# Patient Record
Sex: MALE | Race: OTHER | HISPANIC OR LATINO | Employment: UNEMPLOYED | URBAN - METROPOLITAN AREA
[De-identification: names, ages, dates, MRNs, and addresses within clinical notes are randomized per-mention and may not be internally consistent; named-entity substitution may affect disease eponyms.]

---

## 2017-01-12 ENCOUNTER — HISTORICAL (OUTPATIENT)
Dept: ADMINISTRATIVE | Facility: HOSPITAL | Age: 47
End: 2017-01-12

## 2017-01-13 ENCOUNTER — HISTORICAL (OUTPATIENT)
Dept: SURGERY | Facility: HOSPITAL | Age: 47
End: 2017-01-13

## 2017-05-02 ENCOUNTER — HISTORICAL (OUTPATIENT)
Dept: ENDOSCOPY | Facility: HOSPITAL | Age: 47
End: 2017-05-02

## 2017-05-15 ENCOUNTER — HISTORICAL (OUTPATIENT)
Dept: WOUND CARE | Facility: HOSPITAL | Age: 47
End: 2017-05-15

## 2018-04-09 ENCOUNTER — HOSPITAL ENCOUNTER (OUTPATIENT)
Dept: EMERGENCY MEDICINE | Facility: HOSPITAL | Age: 48
End: 2018-04-10
Attending: INTERNAL MEDICINE | Admitting: INTERNAL MEDICINE

## 2018-04-09 LAB
ABS NEUT (OLG): 3.18 X10(3)/MCL (ref 2.1–9.2)
ALBUMIN SERPL-MCNC: 4.2 GM/DL (ref 3.4–5)
ALBUMIN/GLOB SERPL: 1 RATIO (ref 1–2)
ALP SERPL-CCNC: 128 UNIT/L (ref 45–117)
ALT SERPL-CCNC: 40 UNIT/L (ref 12–78)
APPEARANCE, UA: CLEAR
AST SERPL-CCNC: <3 UNIT/L (ref 15–37)
BACTERIA #/AREA URNS AUTO: ABNORMAL /[HPF]
BASOPHILS # BLD AUTO: 0.02 X10(3)/MCL
BASOPHILS NFR BLD AUTO: 0 %
BILIRUB SERPL-MCNC: 0.6 MG/DL (ref 0.2–1)
BILIRUB UR QL STRIP: NEGATIVE
BILIRUBIN DIRECT+TOT PNL SERPL-MCNC: 0.1 MG/DL
BILIRUBIN DIRECT+TOT PNL SERPL-MCNC: 0.5 MG/DL
BUN SERPL-MCNC: 16 MG/DL (ref 7–18)
CALCIUM SERPL-MCNC: 8.9 MG/DL (ref 8.5–10.1)
CHLORIDE SERPL-SCNC: 102 MMOL/L (ref 98–107)
CHOLEST SERPL-MCNC: 218 MG/DL
CHOLEST/HDLC SERPL: 7.5 {RATIO} (ref 0–5)
CO2 SERPL-SCNC: 27 MMOL/L (ref 21–32)
COLOR UR: ABNORMAL
CREAT SERPL-MCNC: 0.8 MG/DL (ref 0.6–1.3)
EOSINOPHIL # BLD AUTO: 0.16 10*3/UL
EOSINOPHIL NFR BLD AUTO: 3 %
ERYTHROCYTE [DISTWIDTH] IN BLOOD BY AUTOMATED COUNT: 12.8 % (ref 11.5–14.5)
GLOBULIN SER-MCNC: 4.6 GM/ML (ref 2.3–3.5)
GLUCOSE (UA): >1000 MG/DL
GLUCOSE SERPL-MCNC: 274 MG/DL (ref 74–106)
HCT VFR BLD AUTO: 48.6 % (ref 40–51)
HDLC SERPL-MCNC: 29 MG/DL
HGB BLD-MCNC: 17.1 GM/DL (ref 13.5–17.5)
HGB UR QL STRIP: NEGATIVE
HYALINE CASTS #/AREA URNS LPF: ABNORMAL /[LPF]
IMM GRANULOCYTES # BLD AUTO: 0.01 10*3/UL
IMM GRANULOCYTES NFR BLD AUTO: 0 %
KETONES UR QL STRIP: ABNORMAL
LDLC SERPL CALC-MCNC: ABNORMAL MG/DL (ref 0–130)
LEUKOCYTE ESTERASE UR QL STRIP: NEGATIVE
LIPASE SERPL-CCNC: 143 UNIT/L (ref 73–393)
LYMPHOCYTES # BLD AUTO: 1.81 X10(3)/MCL
LYMPHOCYTES NFR BLD AUTO: 33 % (ref 13–40)
MCH RBC QN AUTO: 30 PG (ref 26–34)
MCHC RBC AUTO-ENTMCNC: 35.2 GM/DL (ref 31–37)
MCV RBC AUTO: 85.3 FL (ref 80–100)
MONOCYTES # BLD AUTO: 0.38 X10(3)/MCL
MONOCYTES NFR BLD AUTO: 7 % (ref 4–12)
NEUTROPHILS # BLD AUTO: 3.18 X10(3)/MCL
NEUTROPHILS NFR BLD AUTO: 57 X10(3)/MCL
NITRITE UR QL STRIP: NEGATIVE
PH UR STRIP: 5.5 [PH] (ref 4.5–8)
PLATELET # BLD AUTO: 217 X10(3)/MCL (ref 130–400)
PMV BLD AUTO: 11.5 FL (ref 7.4–10.4)
POC TROPONIN: 0 NG/ML (ref 0–0.08)
POC TROPONIN: 0.04 NG/ML (ref 0–0.08)
POTASSIUM SERPL-SCNC: 4 MMOL/L (ref 3.5–5.1)
PROT SERPL-MCNC: 8.8 GM/DL (ref 6.4–8.2)
PROT UR QL STRIP: NEGATIVE
RBC # BLD AUTO: 5.7 X10(6)/MCL (ref 4.5–5.9)
RBC #/AREA URNS AUTO: ABNORMAL /[HPF]
SODIUM SERPL-SCNC: 134 MMOL/L (ref 136–145)
SP GR UR STRIP: 1.04 (ref 1–1.03)
SQUAMOUS #/AREA URNS LPF: ABNORMAL /[LPF]
TRIGL SERPL-MCNC: 737 MG/DL
TROPONIN I SERPL-MCNC: <0.015 NG/ML (ref 0–0.05)
TROPONIN I SERPL-MCNC: <0.015 NG/ML (ref 0–0.05)
UROBILINOGEN UR STRIP-ACNC: NORMAL
VLDLC SERPL CALC-MCNC: ABNORMAL MG/DL
WBC # SPEC AUTO: 5.6 X10(3)/MCL (ref 4.5–11)
WBC #/AREA URNS AUTO: ABNORMAL /HPF

## 2018-04-10 LAB
ABS NEUT (OLG): 10.61 X10(3)/MCL (ref 2.1–9.2)
ABS NEUT (OLG): 7.45 X10(3)/MCL (ref 2.1–9.2)
ALBUMIN SERPL-MCNC: 3.8 GM/DL (ref 3.4–5)
ALBUMIN/GLOB SERPL: 1 RATIO (ref 1–2)
ALP SERPL-CCNC: 94 UNIT/L (ref 45–117)
ALT SERPL-CCNC: 35 UNIT/L (ref 12–78)
AST SERPL-CCNC: 12 UNIT/L (ref 15–37)
BASOPHILS # BLD AUTO: 0.02 X10(3)/MCL
BASOPHILS # BLD AUTO: 0.02 X10(3)/MCL
BASOPHILS NFR BLD AUTO: 0 %
BASOPHILS NFR BLD AUTO: 0 %
BILIRUB SERPL-MCNC: 0.7 MG/DL (ref 0.2–1)
BILIRUBIN DIRECT+TOT PNL SERPL-MCNC: 0.2 MG/DL
BILIRUBIN DIRECT+TOT PNL SERPL-MCNC: 0.5 MG/DL
BUN SERPL-MCNC: 20 MG/DL (ref 7–18)
CALCIUM SERPL-MCNC: 8.8 MG/DL (ref 8.5–10.1)
CHLORIDE SERPL-SCNC: 104 MMOL/L (ref 98–107)
CO2 SERPL-SCNC: 24 MMOL/L (ref 21–32)
CREAT SERPL-MCNC: 0.9 MG/DL (ref 0.6–1.3)
EOSINOPHIL # BLD AUTO: 0.02 10*3/UL
EOSINOPHIL # BLD AUTO: 0.05 X10(3)/MCL
EOSINOPHIL NFR BLD AUTO: 0 %
EOSINOPHIL NFR BLD AUTO: 0 %
ERYTHROCYTE [DISTWIDTH] IN BLOOD BY AUTOMATED COUNT: 12.9 % (ref 11.5–14.5)
ERYTHROCYTE [DISTWIDTH] IN BLOOD BY AUTOMATED COUNT: 12.9 % (ref 11.5–14.5)
EST. AVERAGE GLUCOSE BLD GHB EST-MCNC: 217 MG/DL
GLOBULIN SER-MCNC: 3.9 GM/ML (ref 2.3–3.5)
GLUCOSE SERPL-MCNC: 268 MG/DL (ref 74–106)
HBA1C MFR BLD: 9.2 % (ref 4.2–6.3)
HCT VFR BLD AUTO: 44.9 % (ref 40–51)
HCT VFR BLD AUTO: 45.5 % (ref 40–51)
HGB BLD-MCNC: 15.9 GM/DL (ref 13.5–17.5)
HGB BLD-MCNC: 15.9 GM/DL (ref 13.5–17.5)
IMM GRANULOCYTES # BLD AUTO: 0.02 10*3/UL
IMM GRANULOCYTES # BLD AUTO: 0.05 10*3/UL
IMM GRANULOCYTES NFR BLD AUTO: 0 %
IMM GRANULOCYTES NFR BLD AUTO: 0 %
LYMPHOCYTES # BLD AUTO: 0.61 X10(3)/MCL
LYMPHOCYTES # BLD AUTO: 0.99 X10(3)/MCL
LYMPHOCYTES NFR BLD AUTO: 7 % (ref 13–40)
LYMPHOCYTES NFR BLD AUTO: 8 % (ref 13–40)
MCH RBC QN AUTO: 29.7 PG (ref 26–34)
MCH RBC QN AUTO: 29.9 PG (ref 26–34)
MCHC RBC AUTO-ENTMCNC: 34.9 GM/DL (ref 31–37)
MCHC RBC AUTO-ENTMCNC: 35.4 GM/DL (ref 31–37)
MCV RBC AUTO: 84.4 FL (ref 80–100)
MCV RBC AUTO: 84.9 FL (ref 80–100)
MONOCYTES # BLD AUTO: 0.42 X10(3)/MCL
MONOCYTES # BLD AUTO: 0.8 X10(3)/MCL
MONOCYTES NFR BLD AUTO: 5 % (ref 4–12)
MONOCYTES NFR BLD AUTO: 6 % (ref 4–12)
NEUTROPHILS # BLD AUTO: 10.61 X10(3)/MCL
NEUTROPHILS # BLD AUTO: 7.45 X10(3)/MCL
NEUTROPHILS NFR BLD AUTO: 85 X10(3)/MCL
NEUTROPHILS NFR BLD AUTO: 87 X10(3)/MCL
PLATELET # BLD AUTO: 184 X10(3)/MCL (ref 130–400)
PLATELET # BLD AUTO: 190 X10(3)/MCL (ref 130–400)
PMV BLD AUTO: 11.2 FL (ref 7.4–10.4)
PMV BLD AUTO: 11.2 FL (ref 7.4–10.4)
POTASSIUM SERPL-SCNC: 3.7 MMOL/L (ref 3.5–5.1)
PROT SERPL-MCNC: 7.7 GM/DL (ref 6.4–8.2)
RBC # BLD AUTO: 5.32 X10(6)/MCL (ref 4.5–5.9)
RBC # BLD AUTO: 5.36 X10(6)/MCL (ref 4.5–5.9)
SODIUM SERPL-SCNC: 135 MMOL/L (ref 136–145)
TROPONIN I SERPL-MCNC: <0.015 NG/ML (ref 0–0.05)
WBC # SPEC AUTO: 12.5 X10(3)/MCL (ref 4.5–11)
WBC # SPEC AUTO: 8.5 X10(3)/MCL (ref 4.5–11)

## 2020-08-20 ENCOUNTER — HISTORICAL (OUTPATIENT)
Dept: RADIOLOGY | Facility: HOSPITAL | Age: 50
End: 2020-08-20

## 2020-09-29 ENCOUNTER — HISTORICAL (OUTPATIENT)
Dept: ADMINISTRATIVE | Facility: HOSPITAL | Age: 50
End: 2020-09-29

## 2020-09-29 LAB
ABS NEUT (OLG): 3.57 X10(3)/MCL (ref 2.1–9.2)
ALBUMIN SERPL-MCNC: 4.2 GM/DL (ref 3.4–5)
ALBUMIN/GLOB SERPL: 1.1 RATIO (ref 1.1–2)
ALP SERPL-CCNC: 74 UNIT/L (ref 45–117)
ALT SERPL-CCNC: 40 UNIT/L (ref 12–78)
AST SERPL-CCNC: 10 UNIT/L (ref 15–37)
BASOPHILS # BLD AUTO: 0 X10(3)/MCL (ref 0–0.2)
BASOPHILS NFR BLD AUTO: 0 %
BILIRUB SERPL-MCNC: 0.6 MG/DL (ref 0.2–1)
BILIRUBIN DIRECT+TOT PNL SERPL-MCNC: 0.1 MG/DL (ref 0–0.2)
BILIRUBIN DIRECT+TOT PNL SERPL-MCNC: 0.5 MG/DL
BUN SERPL-MCNC: 10 MG/DL (ref 7–18)
CALCIUM SERPL-MCNC: 9.5 MG/DL (ref 8.5–10.1)
CHLORIDE SERPL-SCNC: 104 MMOL/L (ref 98–107)
CHOLEST SERPL-MCNC: 237 MG/DL
CHOLEST/HDLC SERPL: 6.4 {RATIO} (ref 0–5)
CO2 SERPL-SCNC: 29 MMOL/L (ref 21–32)
CREAT SERPL-MCNC: 0.8 MG/DL (ref 0.6–1.3)
CREAT UR-MCNC: 120 MG/DL
EOSINOPHIL # BLD AUTO: 0.2 X10(3)/MCL (ref 0–0.9)
EOSINOPHIL NFR BLD AUTO: 3 %
ERYTHROCYTE [DISTWIDTH] IN BLOOD BY AUTOMATED COUNT: 12.5 % (ref 11.5–14.5)
EST. AVERAGE GLUCOSE BLD GHB EST-MCNC: 192 MG/DL
FERRITIN SERPL-MCNC: 175.5 NG/ML (ref 26–388)
GLOBULIN SER-MCNC: 3.9 GM/ML (ref 2.3–3.5)
GLUCOSE SERPL-MCNC: 153 MG/DL (ref 74–106)
HAV IGM SERPL QL IA: NONREACTIVE
HBA1C MFR BLD: 8.3 % (ref 4.2–6.3)
HBV CORE IGM SERPL QL IA: NONREACTIVE
HBV SURFACE AG SERPL QL IA: NONREACTIVE
HCT VFR BLD AUTO: 44.7 % (ref 40–51)
HCV AB SERPL QL IA: NONREACTIVE
HDLC SERPL-MCNC: 37 MG/DL (ref 40–59)
HGB BLD-MCNC: 15.8 GM/DL (ref 13.5–17.5)
HIV 1+2 AB+HIV1 P24 AG SERPL QL IA: NONREACTIVE
IMM GRANULOCYTES # BLD AUTO: 0.01 10*3/UL
IMM GRANULOCYTES NFR BLD AUTO: 0 %
LDLC SERPL CALC-MCNC: ABNORMAL MG/DL
LYMPHOCYTES # BLD AUTO: 2.3 X10(3)/MCL (ref 0.6–4.6)
LYMPHOCYTES NFR BLD AUTO: 35 %
MCH RBC QN AUTO: 29.8 PG (ref 26–34)
MCHC RBC AUTO-ENTMCNC: 35.3 GM/DL (ref 31–37)
MCV RBC AUTO: 84.2 FL (ref 80–100)
MONOCYTES # BLD AUTO: 0.5 X10(3)/MCL (ref 0.1–1.3)
MONOCYTES NFR BLD AUTO: 7 %
NEUTROPHILS # BLD AUTO: 3.57 X10(3)/MCL (ref 2.1–9.2)
NEUTROPHILS NFR BLD AUTO: 54 %
PLATELET # BLD AUTO: 229 X10(3)/MCL (ref 130–400)
PMV BLD AUTO: 10.3 FL (ref 7.4–10.4)
POTASSIUM SERPL-SCNC: 4.1 MMOL/L (ref 3.5–5.1)
PROT SERPL-MCNC: 8.1 GM/DL (ref 6.4–8.2)
PROT UR STRIP-MCNC: 27.8 MG/DL
PROT/CREAT UR-RTO: 231.7 MG/GM
PSA SERPL-MCNC: 0.8 NG/ML
RBC # BLD AUTO: 5.31 X10(6)/MCL (ref 4.5–5.9)
SODIUM SERPL-SCNC: 139 MMOL/L (ref 136–145)
TRIGL SERPL-MCNC: 690 MG/DL
TSH SERPL-ACNC: 1.9 MIU/L (ref 0.36–3.74)
VLDLC SERPL CALC-MCNC: ABNORMAL MG/DL
WBC # SPEC AUTO: 6.6 X10(3)/MCL (ref 4.5–11)

## 2020-12-30 ENCOUNTER — HISTORICAL (OUTPATIENT)
Dept: INTERNAL MEDICINE | Facility: CLINIC | Age: 50
End: 2020-12-30

## 2020-12-30 LAB
ALBUMIN SERPL-MCNC: 4.5 GM/DL (ref 3.5–5)
ALBUMIN/GLOB SERPL: 1.4 RATIO (ref 1.1–2)
ALP SERPL-CCNC: 90 UNIT/L (ref 40–150)
ALT SERPL-CCNC: 30 UNIT/L (ref 0–55)
AST SERPL-CCNC: 25 UNIT/L (ref 5–34)
BILIRUB SERPL-MCNC: 0.6 MG/DL
BILIRUBIN DIRECT+TOT PNL SERPL-MCNC: 0.3 MG/DL (ref 0–0.5)
BILIRUBIN DIRECT+TOT PNL SERPL-MCNC: 0.3 MG/DL (ref 0–0.8)
BUN SERPL-MCNC: 15 MG/DL (ref 8.4–25.7)
CALCIUM SERPL-MCNC: 9.4 MG/DL (ref 8.4–10.2)
CHLORIDE SERPL-SCNC: 102 MMOL/L (ref 98–107)
CHOLEST SERPL-MCNC: 123 MG/DL
CHOLEST/HDLC SERPL: 4 {RATIO} (ref 0–5)
CO2 SERPL-SCNC: 27 MMOL/L (ref 22–29)
CREAT SERPL-MCNC: 0.9 MG/DL (ref 0.73–1.18)
CREAT UR-MCNC: 114.8 MG/DL (ref 58–161)
EST. AVERAGE GLUCOSE BLD GHB EST-MCNC: 269 MG/DL
GLOBULIN SER-MCNC: 3.3 GM/DL (ref 2.4–3.5)
GLUCOSE SERPL-MCNC: 292 MG/DL (ref 74–100)
HBA1C MFR BLD: 11 %
HDLC SERPL-MCNC: 32 MG/DL (ref 35–60)
LDLC SERPL CALC-MCNC: 42 MG/DL (ref 50–140)
MICROALBUMIN UR-MCNC: 119.4 UG/ML
MICROALBUMIN/CREAT RATIO PNL UR: 104 MG/GM CR (ref 0–30)
POTASSIUM SERPL-SCNC: 4.2 MMOL/L (ref 3.5–5.1)
PROT SERPL-MCNC: 7.8 GM/DL (ref 6.4–8.3)
SODIUM SERPL-SCNC: 138 MMOL/L (ref 136–145)
TRIGL SERPL-MCNC: 247 MG/DL (ref 34–140)
VLDLC SERPL CALC-MCNC: 49 MG/DL

## 2021-01-28 ENCOUNTER — HISTORICAL (OUTPATIENT)
Dept: INTERNAL MEDICINE | Facility: CLINIC | Age: 51
End: 2021-01-28

## 2021-01-28 LAB
ALBUMIN SERPL-MCNC: 4.5 GM/DL (ref 3.5–5)
ALBUMIN/GLOB SERPL: 1.4 RATIO (ref 1.1–2)
ALP SERPL-CCNC: 75 UNIT/L (ref 40–150)
ALT SERPL-CCNC: 29 UNIT/L (ref 0–55)
AST SERPL-CCNC: 22 UNIT/L (ref 5–34)
BILIRUB SERPL-MCNC: 0.7 MG/DL
BILIRUBIN DIRECT+TOT PNL SERPL-MCNC: 0.3 MG/DL (ref 0–0.5)
BILIRUBIN DIRECT+TOT PNL SERPL-MCNC: 0.4 MG/DL (ref 0–0.8)
BUN SERPL-MCNC: 14 MG/DL (ref 8.4–25.7)
CALCIUM SERPL-MCNC: 9.4 MG/DL (ref 8.4–10.2)
CHLORIDE SERPL-SCNC: 101 MMOL/L (ref 98–107)
CO2 SERPL-SCNC: 28 MMOL/L (ref 22–29)
CREAT SERPL-MCNC: 0.98 MG/DL (ref 0.73–1.18)
GLOBULIN SER-MCNC: 3.2 GM/DL (ref 2.4–3.5)
GLUCOSE SERPL-MCNC: 245 MG/DL (ref 74–100)
POTASSIUM SERPL-SCNC: 4.6 MMOL/L (ref 3.5–5.1)
PROT SERPL-MCNC: 7.7 GM/DL (ref 6.4–8.3)
SODIUM SERPL-SCNC: 137 MMOL/L (ref 136–145)

## 2021-03-10 ENCOUNTER — HISTORICAL (OUTPATIENT)
Dept: INTERNAL MEDICINE | Facility: CLINIC | Age: 51
End: 2021-03-10

## 2021-03-10 LAB
ALBUMIN SERPL-MCNC: 4.4 GM/DL (ref 3.5–5)
ALBUMIN/GLOB SERPL: 1.4 RATIO (ref 1.1–2)
ALP SERPL-CCNC: 74 UNIT/L (ref 40–150)
ALT SERPL-CCNC: 34 UNIT/L (ref 0–55)
AST SERPL-CCNC: 24 UNIT/L (ref 5–34)
BILIRUB SERPL-MCNC: 0.8 MG/DL
BILIRUBIN DIRECT+TOT PNL SERPL-MCNC: 0.3 MG/DL (ref 0–0.5)
BILIRUBIN DIRECT+TOT PNL SERPL-MCNC: 0.5 MG/DL (ref 0–0.8)
BUN SERPL-MCNC: 15.8 MG/DL (ref 8.4–25.7)
CALCIUM SERPL-MCNC: 9.3 MG/DL (ref 8.4–10.2)
CHLORIDE SERPL-SCNC: 102 MMOL/L (ref 98–107)
CO2 SERPL-SCNC: 28 MMOL/L (ref 22–29)
CREAT SERPL-MCNC: 0.75 MG/DL (ref 0.73–1.18)
CREAT UR-MCNC: 133.5 MG/DL (ref 58–161)
EST. AVERAGE GLUCOSE BLD GHB EST-MCNC: 271.9 MG/DL
GLOBULIN SER-MCNC: 3.2 GM/DL (ref 2.4–3.5)
GLUCOSE SERPL-MCNC: 202 MG/DL (ref 74–100)
HBA1C MFR BLD: 11.1 %
MICROALBUMIN UR-MCNC: 24.8 MG/L
MICROALBUMIN/CREAT RATIO PNL UR: 18.6 MG/GM CR (ref 0–30)
POTASSIUM SERPL-SCNC: 4.2 MMOL/L (ref 3.5–5.1)
PROT SERPL-MCNC: 7.6 GM/DL (ref 6.4–8.3)
SODIUM SERPL-SCNC: 139 MMOL/L (ref 136–145)

## 2021-07-30 ENCOUNTER — HISTORICAL (OUTPATIENT)
Dept: INTERNAL MEDICINE | Facility: CLINIC | Age: 51
End: 2021-07-30

## 2021-07-30 LAB
ABS NEUT (OLG): 4.81 X10(3)/MCL (ref 2.1–9.2)
ALBUMIN SERPL-MCNC: 4.3 GM/DL (ref 3.5–5)
ALBUMIN/GLOB SERPL: 1.3 RATIO (ref 1.1–2)
ALP SERPL-CCNC: 86 UNIT/L (ref 40–150)
ALT SERPL-CCNC: 35 UNIT/L (ref 0–55)
APPEARANCE, UA: CLEAR
AST SERPL-CCNC: 28 UNIT/L (ref 5–34)
BACTERIA #/AREA URNS AUTO: ABNORMAL /HPF
BASOPHILS # BLD AUTO: 0 X10(3)/MCL (ref 0–0.2)
BASOPHILS NFR BLD AUTO: 0 %
BILIRUB SERPL-MCNC: 0.7 MG/DL
BILIRUB UR QL STRIP: NEGATIVE
BILIRUBIN DIRECT+TOT PNL SERPL-MCNC: 0.3 MG/DL (ref 0–0.5)
BILIRUBIN DIRECT+TOT PNL SERPL-MCNC: 0.4 MG/DL (ref 0–0.8)
BUN SERPL-MCNC: 15.1 MG/DL (ref 8.4–25.7)
CALCIUM SERPL-MCNC: 9.7 MG/DL (ref 8.4–10.2)
CHLORIDE SERPL-SCNC: 102 MMOL/L (ref 98–107)
CO2 SERPL-SCNC: 30 MMOL/L (ref 22–29)
COLOR UR: YELLOW
CREAT SERPL-MCNC: 0.84 MG/DL (ref 0.73–1.18)
EOSINOPHIL # BLD AUTO: 0.1 X10(3)/MCL (ref 0–0.9)
EOSINOPHIL NFR BLD AUTO: 2 %
ERYTHROCYTE [DISTWIDTH] IN BLOOD BY AUTOMATED COUNT: 12.7 % (ref 11.5–14.5)
EST. AVERAGE GLUCOSE BLD GHB EST-MCNC: 277.6 MG/DL
GLOBULIN SER-MCNC: 3.3 GM/DL (ref 2.4–3.5)
GLUCOSE (UA): 30 MG/DL
GLUCOSE SERPL-MCNC: 195 MG/DL (ref 74–100)
HBA1C MFR BLD: 11.3 %
HCT VFR BLD AUTO: 44.9 % (ref 40–51)
HGB BLD-MCNC: 15 GM/DL (ref 13.5–17.5)
HGB UR QL STRIP: NEGATIVE
HYALINE CASTS #/AREA URNS LPF: ABNORMAL /LPF
IMM GRANULOCYTES # BLD AUTO: 0.02 10*3/UL
IMM GRANULOCYTES NFR BLD AUTO: 0 %
KETONES UR QL STRIP: NEGATIVE
LEUKOCYTE ESTERASE UR QL STRIP: NEGATIVE
LYMPHOCYTES # BLD AUTO: 2.1 X10(3)/MCL (ref 0.6–4.6)
LYMPHOCYTES NFR BLD AUTO: 27 %
MCH RBC QN AUTO: 28.6 PG (ref 26–34)
MCHC RBC AUTO-ENTMCNC: 33.4 GM/DL (ref 31–37)
MCV RBC AUTO: 85.7 FL (ref 80–100)
MONOCYTES # BLD AUTO: 0.6 X10(3)/MCL (ref 0.1–1.3)
MONOCYTES NFR BLD AUTO: 8 %
NEUTROPHILS # BLD AUTO: 4.81 X10(3)/MCL (ref 2.1–9.2)
NEUTROPHILS NFR BLD AUTO: 62 %
NITRITE UR QL STRIP: NEGATIVE
NRBC BLD AUTO-RTO: 0 % (ref 0–0.2)
PH UR STRIP: 6.5 [PH] (ref 4.5–8)
PLATELET # BLD AUTO: 226 X10(3)/MCL (ref 130–400)
PMV BLD AUTO: 10.8 FL (ref 7.4–10.4)
POTASSIUM SERPL-SCNC: 4.3 MMOL/L (ref 3.5–5.1)
PROT SERPL-MCNC: 7.6 GM/DL (ref 6.4–8.3)
PROT UR QL STRIP: 10 MG/DL
RBC # BLD AUTO: 5.24 X10(6)/MCL (ref 4.5–5.9)
RBC #/AREA URNS AUTO: ABNORMAL /HPF
SODIUM SERPL-SCNC: 139 MMOL/L (ref 136–145)
SP GR UR STRIP: 1.02 (ref 1–1.03)
SQUAMOUS #/AREA URNS LPF: ABNORMAL /LPF
UROBILINOGEN UR STRIP-ACNC: 4 MG/DL
WBC # SPEC AUTO: 7.8 X10(3)/MCL (ref 4.5–11)
WBC #/AREA URNS AUTO: ABNORMAL /HPF

## 2021-09-10 ENCOUNTER — HISTORICAL (OUTPATIENT)
Dept: ADMINISTRATIVE | Facility: HOSPITAL | Age: 51
End: 2021-09-10

## 2021-09-10 LAB
ABS NEUT (OLG): 4.44 X10(3)/MCL (ref 2.1–9.2)
BASOPHILS # BLD AUTO: 0 X10(3)/MCL (ref 0–0.2)
BASOPHILS NFR BLD AUTO: 0 %
BUN SERPL-MCNC: 15.2 MG/DL (ref 8.4–25.7)
CALCIUM SERPL-MCNC: 9.4 MG/DL (ref 8.4–10.2)
CHLORIDE SERPL-SCNC: 109 MMOL/L (ref 98–107)
CO2 SERPL-SCNC: 23 MMOL/L (ref 22–29)
CREAT SERPL-MCNC: 0.88 MG/DL (ref 0.73–1.18)
CREAT/UREA NIT SERPL: 17
EOSINOPHIL # BLD AUTO: 0.1 X10(3)/MCL (ref 0–0.9)
EOSINOPHIL NFR BLD AUTO: 2 %
ERYTHROCYTE [DISTWIDTH] IN BLOOD BY AUTOMATED COUNT: 12.8 % (ref 11.5–14.5)
EST CREAT CLEARANCE SER (OHS): 85.19 ML/MIN
EST. AVERAGE GLUCOSE BLD GHB EST-MCNC: 217.3 MG/DL
GLUCOSE SERPL-MCNC: 166 MG/DL (ref 74–100)
HBA1C MFR BLD: 9.2 %
HCT VFR BLD AUTO: 42.3 % (ref 40–51)
HGB BLD-MCNC: 14.6 GM/DL (ref 13.5–17.5)
IMM GRANULOCYTES # BLD AUTO: 0.01 10*3/UL
IMM GRANULOCYTES NFR BLD AUTO: 0 %
LYMPHOCYTES # BLD AUTO: 1.9 X10(3)/MCL (ref 0.6–4.6)
LYMPHOCYTES NFR BLD AUTO: 27 %
MCH RBC QN AUTO: 29 PG (ref 26–34)
MCHC RBC AUTO-ENTMCNC: 34.5 GM/DL (ref 31–37)
MCV RBC AUTO: 84.1 FL (ref 80–100)
MONOCYTES # BLD AUTO: 0.5 X10(3)/MCL (ref 0.1–1.3)
MONOCYTES NFR BLD AUTO: 8 %
NEUTROPHILS # BLD AUTO: 4.44 X10(3)/MCL (ref 2.1–9.2)
NEUTROPHILS NFR BLD AUTO: 63 %
NRBC BLD AUTO-RTO: 0 % (ref 0–0.2)
PLATELET # BLD AUTO: 218 X10(3)/MCL (ref 130–400)
PMV BLD AUTO: 9.9 FL (ref 7.4–10.4)
POTASSIUM SERPL-SCNC: 3.6 MMOL/L (ref 3.5–5.1)
RBC # BLD AUTO: 5.03 X10(6)/MCL (ref 4.5–5.9)
SARS-COV-2 AG RESP QL IA.RAPID: NEGATIVE
SODIUM SERPL-SCNC: 141 MMOL/L (ref 136–145)
WBC # SPEC AUTO: 7 X10(3)/MCL (ref 4.5–11)

## 2021-09-13 ENCOUNTER — HISTORICAL (OUTPATIENT)
Dept: SURGERY | Facility: HOSPITAL | Age: 51
End: 2021-09-13

## 2021-09-14 ENCOUNTER — HISTORICAL (OUTPATIENT)
Dept: INTERNAL MEDICINE | Facility: CLINIC | Age: 51
End: 2021-09-14

## 2021-09-14 LAB
ALBUMIN SERPL-MCNC: 4.6 GM/DL (ref 3.5–5)
ALBUMIN/GLOB SERPL: 1.2 RATIO (ref 1.1–2)
ALP SERPL-CCNC: 71 UNIT/L (ref 40–150)
ALT SERPL-CCNC: 32 UNIT/L (ref 0–55)
AST SERPL-CCNC: 16 UNIT/L (ref 5–34)
BILIRUB SERPL-MCNC: 0.7 MG/DL
BILIRUBIN DIRECT+TOT PNL SERPL-MCNC: 0.3 MG/DL (ref 0–0.5)
BILIRUBIN DIRECT+TOT PNL SERPL-MCNC: 0.4 MG/DL (ref 0–0.8)
BUN SERPL-MCNC: 14.4 MG/DL (ref 8.4–25.7)
CALCIUM SERPL-MCNC: 9.7 MG/DL (ref 8.4–10.2)
CHLORIDE SERPL-SCNC: 105 MMOL/L (ref 98–107)
CO2 SERPL-SCNC: 26 MMOL/L (ref 22–29)
CREAT SERPL-MCNC: 0.83 MG/DL (ref 0.73–1.18)
GLOBULIN SER-MCNC: 3.8 GM/DL (ref 2.4–3.5)
GLUCOSE SERPL-MCNC: 151 MG/DL (ref 74–100)
POTASSIUM SERPL-SCNC: 3.6 MMOL/L (ref 3.5–5.1)
PROT SERPL-MCNC: 8.4 GM/DL (ref 6.4–8.3)
SODIUM SERPL-SCNC: 139 MMOL/L (ref 136–145)

## 2021-12-10 ENCOUNTER — HISTORICAL (OUTPATIENT)
Dept: INTERNAL MEDICINE | Facility: CLINIC | Age: 51
End: 2021-12-10

## 2021-12-10 LAB
ALBUMIN SERPL-MCNC: 4.3 GM/DL (ref 3.5–5)
ALBUMIN/GLOB SERPL: 1.2 RATIO (ref 1.1–2)
ALP SERPL-CCNC: 83 UNIT/L (ref 40–150)
ALT SERPL-CCNC: 42 UNIT/L (ref 0–55)
AST SERPL-CCNC: 23 UNIT/L (ref 5–34)
BILIRUB SERPL-MCNC: 0.6 MG/DL
BILIRUBIN DIRECT+TOT PNL SERPL-MCNC: 0.3 MG/DL (ref 0–0.5)
BILIRUBIN DIRECT+TOT PNL SERPL-MCNC: 0.3 MG/DL (ref 0–0.8)
BUN SERPL-MCNC: 13.2 MG/DL (ref 8.4–25.7)
CALCIUM SERPL-MCNC: 9.8 MG/DL (ref 8.7–10.5)
CHLORIDE SERPL-SCNC: 105 MMOL/L (ref 98–107)
CHOLEST SERPL-MCNC: 96 MG/DL
CHOLEST/HDLC SERPL: 3 {RATIO} (ref 0–5)
CO2 SERPL-SCNC: 29 MMOL/L (ref 22–29)
CREAT SERPL-MCNC: 0.82 MG/DL (ref 0.73–1.18)
CREAT UR-MCNC: 109.1 MG/DL (ref 58–161)
EST. AVERAGE GLUCOSE BLD GHB EST-MCNC: 180 MG/DL
GLOBULIN SER-MCNC: 3.6 GM/DL (ref 2.4–3.5)
GLUCOSE SERPL-MCNC: 168 MG/DL (ref 74–100)
HBA1C MFR BLD: 7.9 %
HDLC SERPL-MCNC: 35 MG/DL (ref 35–60)
LDLC SERPL CALC-MCNC: 33 MG/DL (ref 50–140)
MICROALBUMIN UR-MCNC: 6.6 MG/L
MICROALBUMIN/CREAT RATIO PNL UR: 6 MG/GM CR (ref 0–30)
POTASSIUM SERPL-SCNC: 4.6 MMOL/L (ref 3.5–5.1)
PROT SERPL-MCNC: 7.9 GM/DL (ref 6.4–8.3)
SODIUM SERPL-SCNC: 142 MMOL/L (ref 136–145)
TRIGL SERPL-MCNC: 139 MG/DL (ref 34–140)
TSH SERPL-ACNC: 2.45 UIU/ML (ref 0.35–4.94)
VLDLC SERPL CALC-MCNC: 28 MG/DL

## 2021-12-15 LAB
LEFT EYE DM RETINOPATHY: NEGATIVE
RIGHT EYE DM RETINOPATHY: NEGATIVE

## 2022-01-11 LAB — HEMOCCULT STL QL IA: NEGATIVE

## 2022-02-28 ENCOUNTER — HISTORICAL (OUTPATIENT)
Dept: INTERNAL MEDICINE | Facility: CLINIC | Age: 52
End: 2022-02-28

## 2022-02-28 LAB
EST. AVERAGE GLUCOSE BLD GHB EST-MCNC: 205.9 MG/DL
HBA1C MFR BLD: 8.8 %

## 2022-03-15 ENCOUNTER — HISTORICAL (OUTPATIENT)
Dept: ADMINISTRATIVE | Facility: HOSPITAL | Age: 52
End: 2022-03-15

## 2022-04-10 ENCOUNTER — HISTORICAL (OUTPATIENT)
Dept: ADMINISTRATIVE | Facility: HOSPITAL | Age: 52
End: 2022-04-10

## 2022-04-27 VITALS
SYSTOLIC BLOOD PRESSURE: 125 MMHG | DIASTOLIC BLOOD PRESSURE: 77 MMHG | HEIGHT: 65 IN | OXYGEN SATURATION: 98 % | BODY MASS INDEX: 33.61 KG/M2 | WEIGHT: 201.75 LBS

## 2022-04-30 NOTE — ED PROVIDER NOTES
Patient:   Sinan Jimenez             MRN: [id number]            FIN: [id number]               Age:   48 years     Sex:  Male     :  1970   Associated Diagnoses:   Chest pain; Abdominal pain, acute, epigastric   Author:   Jose Angel ALCAZAR, Delon BROWN      Basic Information   Time seen: Date & time 2018 08:45:00.   History source: Patient.   Additional information: Chief Complaint from Nursing Triage Note : Chief Complaint   2018 8:34 CDT        Chief Complaint           PT W CO CP W NV, HA AND RUQ ABD PAIN THAT RAD AROUND TO BACK X 3 DAYS.  EKG OBTAINED,  HX OF DM   .      History of Present Illness   The patient presents with chest pain, indigestion and heartburn.  The onset was 3  days ago.  The course/duration of symptoms is constant and worsening.  Location: Right epigastric RUQ. Radiating pain: central back. The character of symptoms is sharp.  The degree at onset was moderate.  The degree at maximum was moderate.  The degree at present is moderate.  The exacerbating factor is none.  The relieving factor is none.  Risk factors consist of hypertension and diabetes mellitus.  Associated symptoms: nausea, vomiting and history of rib fractures 2017, CP since then.        Review of Systems   Cardiovascular symptoms:  Chest pain.   Gastrointestinal symptoms:  Abdominal pain, right upper quadrant, epigastric, nausea, vomiting.              Additional review of systems information: All other systems reviewed and otherwise negative.      Health Status   Allergies:    Allergic Reactions (Selected)  No Known Allergies.      Past Medical/ Family/ Social History   Medical history:    Active  Hypertension ([id number])  DM2 (diabetes mellitus, type 2) ([id number])  HTN (hypertension) ([id number]), Reviewed as documented in chart.   Surgical history:    Biopsy Gastrointestinal on 2017 at 47  Years.  Comments:  5/2/2017 14:03 - Mariel Flaherty RN  auto-populated from documented surgical case  Esophagogastroduodenoscopy on 5/2/2017 at 47 Years.  Comments:  5/2/2017 14:03 - Mariel Flaherty RN  auto-populated from documented surgical case  Cholecystectomy Laparoscopic (None) on 1/13/2017 at 46 Years.  Comments:  1/13/2017 10:28 - Kayode NOONAN , Maria Elena CORRIGAN  auto-populated from documented surgical case  denies.  Cholecystectomy ([id number])., Reviewed as documented in chart.   Family history:    Diabetes mellitus type 2  Mother  Brother  Hypertension.  Brother  Alcoholism.  Father  , Reviewed as documented in chart.   Social history: Alcohol use: Denies, Tobacco use: Denies, Drug use: Denies.      Physical Examination               Vital Signs   Vital Signs   4/9/2018 8:34 CDT        Temperature Oral          36.7 DegC                             Temperature Oral (calculated)             98.06 DegF                             Peripheral Pulse Rate     95 bpm                             Respiratory Rate          16 br/min                             SpO2                      98 %                             Oxygen Therapy            Room air                             Systolic Blood Pressure   157 mmHg  HI                             Diastolic Blood Pressure  101 mmHg  HI  .   Measurements   4/9/2018 8:34 CDT        Weight Dosing             92 kg                             Weight Measured           92 kg                             Weight Measured and Calculated in Lbs     202.82 lb                             Height/Length Dosing      170 cm                             Height/Length Measured    170 cm                             Body Mass Index Measured  31.83 kg/m2  .   Basic Oxygen Information   4/9/2018 8:34 CDT        SpO2                      98 %                             Oxygen Therapy            Room air  .   General:  Alert.   Skin:  Pink, intact.    Head:  Atraumatic.   Neck:  Trachea midline.    Eye:  Pupils are equal, round and reactive to light, extraocular movements are intact, normal conjunctiva.    Ears, nose, mouth and throat:  Oral mucosa moist.   Cardiovascular:  Regular rate and rhythm.   Respiratory:  Lungs are clear to auscultation.   Gastrointestinal:  Soft, Non distended, Normal bowel sounds, No organomegaly, + Epigastric and RUQ TTP.    Back:  Normal range of motion.   Musculoskeletal:  Normal ROM.   Neurological:  Alert and oriented to person, place, time, and situation.   Psychiatric:  Cooperative, appropriate mood & affect.              Additional physical exam information: Traslator line used for workup.      Medical Decision Making   Electrocardiogram:  Time 4/9/2018 08:30:00, rate 84, normal sinus rhythm, EP Interp, inf infaract age indeter.       Impression and Plan   Diagnosis   Chest pain (PNED [id number])   Abdominal pain, acute, epigastric (CLR94-UO R10.13)      Calls-Consults   -  4/9/2018 13:09:00 , Mane ALCAZAR, Lidia DING, Medicine.    Plan   Disposition

## 2022-04-30 NOTE — DISCHARGE SUMMARY
* Final Report *  Admission Information This is a 48-year-old  male with past medical history of traumatic forklift accident with splenic contusions and left rib fractures July 2017, type II diabetes, and hypertension, however, not prescribed any medications for either diagnosis, who presents to [facility] emergency department for right-sided chest pain. Patient describes via official  pain as sharp, constant, right-sided with radiation to side, nonreproducible presences clinch injury in the past. Pain is not associated with shortness of breath, cough, dyspnea and exertion, PND, orthopnea, bilateral lower extremity anemia, relief with changes in position, palpatations, or syncope, or cough. Patient states that he has had traumatic crush injury previously, resulting in multiple left-sided rib fractures for which he is on multiple ER visits. Chest x-ray consistent with old left-sided rib fractures and abdominal x-ray shows a nonspecific gas pattern. EKG normal sinus rhythm with Q waves in II and aVF unchanged from previous EKG. POC troponin negative and upon trended ×3 every 6 hours negative, and cholesterol 218. Initial blood pressure 157/101 however improved with analgesics. Ultrasound reveals hepatic steatosis however, patient denies drinking history. Hospital Course   Significant Findings troponin every 6 hours ×3 negative  Cholesterol 218 per lipid panel  A1c 9.2  EKG with no acute new findings   Procedures and Treatment Provided Patient medically managed with aspirin, Toradol, statin, Protonix  He is admitted for ACS rule out with no acute cardiac findings  Chest x-ray consistent with atelectasis likely due from previous lung injury  48 year-old  male with past medical history of traumatic injury to left side resulting in multiple rib fractures and chronic rib pain admitted for ACS rule out who presented with chest pain. Troponins negative with no acute findings however A1c 9.2 as well as  hyperlipidemia therefore patient discharged with metformin thousand twice a day, lisinopril 5 daily, rosuvastatin 40 mg take daily at night. He should follow-up with PCP 1-2 weeks postdischarge.  Physical Exam General: AAOx3, NAD, alert and cooperative  HEENT: PERRLA, EOMI  Neck: no LAD, no JVD, supple  CVS: S1/S2 nml, RRR, no murmurs, tenderness to palpation across chest  Resp: CTA b/l, no wheezing  GI: Not distended, not tender, BS+, no guarding  Musculoskeletal: normal ROM, no joint tenderness, normal muscular development  Extremities: no peripheral edema, peripheral pulses intact and equal  Neuro: CN II-XII grossly intact, strength and sensation symmetric and intact throughout, no focal neurological deficit  Discharge Plan She admitted for ACS rule out with no significant cardiac findings during hospitalizations  Troponins every 6 negative as well as no acute findings  Patient A1c found to be 9.2 therefore metformin thousand twice a day started  Recommend patient start lisinopril 5 mg daily for hypertension as well as and statin for hyperlipidemia  Patient to follow-up with PCP in 1-2 weeks from discharge  Patient is instructed to return to emergency department if symptoms worsen or return  Patient instructed to take over-the-counter analgesics for rib pain   used so the patient does understand and agree with plan  Orders:   lisinopril, 5 mg = 1 tab(s), Oral, Daily, # 30 tab(s), 3 Refill(s) metFORMIN, 1,000 mg = 1 tab(s), Oral, BID, # 60 tab(s), 3 Refill(s) rosuvastatin, 40 mg = 1 tab(s), Oral, Daily, # 30 tab(s), 3 Refill(s) Discharge, Home   Patient Discharge Condition He is stable and okay for discharge home   Result type: Discharge Summary   Result date: April 10, 2018 13:47 CDT   Result status: Auth (Verified)   Result title: Discharge Note   Performed by: Denice Crow DO on April 10, 2018 13:54 CDT   Verified by: Denice Crow DO on April 10, 2018 14:01 CDT   Encounter info:  [id number], [facility], Inpatient, 6/22/2017 - 6/30/2017   Doc has been moved by HIM specialist to the correct doc type.

## 2022-04-30 NOTE — OP NOTE
DATE OF SURGERY:    05/02/2017    SURGEON:  Sundar Guido MD    attending physician:  Sundar Guido MD    PROCEDURE:  Esophagogastroduodenoscopy with biopsy.    PREOPERATIVE DIAGNOSIS:  Abdominal pain.    POSTOPERATIVE DIAGNOSES:    1. Abdominal pain.   2. Gastritis.    FINDINGS:  Mild diffuse gastric mucosal erythema.    SPECIMEN:  Yes.    BLOOD LOSS:  Trace.    DESCRIPTION OF PROCEDURE:  After informed consent was obtained, patient was sedated with IV propofol after which time the Olympus video gastroscope was inserted into the oropharynx and advanced down the esophagus, finding no evidence of esophageal mucosal abnormalities.  The stomach was then inspected, finding mild diffuse gastric mucosal erythema with submucosal hemorrhage suggestive of H pylori.  The scope was advanced down to the pylorus which was normal and into the duodenum, and down to the 3rd portion of the duodenum finding no duodenal abnormalities.  The scope was then was brought back into the stomach, retroflexed, again noting the mild diffuse gastric erythema.  An antral biopsy was obtained for tissue urease test, after which time the scope was withdrawn and the patient then sent to the holding area.        ______________________________  MD NAT Couch/MODL  DD:  05/02/2017  Time:  02:06PM  DT:  05/02/2017  Time:  02:21PM  Job #:  [id number]

## 2022-04-30 NOTE — OP NOTE
DATE OF SURGERY:        SURGEON:  Adebayo Molina MD    attending physician:  Adebayo Molina MD    PREOPERATIVE DIAGNOSIS:  Basal cell carcinoma of the vertex scalp, less than 2 cm.    POSTOPERATIVE DIAGNOSIS:  Basal cell carcinoma of the vertex scalp, less than 2 cm.    PROCEDURE:  Primary excision of basal cell carcinoma of the scalp, 1.9 cm with primary closure.    INDICATIONS FOR PROCEDURE:  The patient is a 51-year-old with a long-standing history of slow growing lesion on his head.  He presents for excisional biopsy and primary closure.    ANESTHESIA:  General.    COMPLICATIONS:  None.    PROCEDURE IN DETAIL:  The patient was endotracheally intubated, prepped and draped in the usual sterile fashion.  A circular incision was made using 4 mm margins around the basal cell carcinoma using a 15 blade scalpel after 1% lidocaine epinephrine was instilled into the incision.  After this was cut off, it was sent to Pathology.  We closed the deep tissues with interrupted 0 Vicryl suture and then staples used to close the scalp.  No complications.  I was scrubbed and present for the entire procedure.        ______________________________  MD JUNG Ramirez/MODL  DD:  09/13/2021  Time:  10:34AM  DT:  09/13/2021  Time:  10:55AM  Job #:  [id number]

## 2022-05-03 NOTE — HISTORICAL [facility] CERNER
This is a historical note converted from Shav[employer]. Formatting and pictures may have been removed.  Please reference Shavonne for original formatting and attached multimedia. 51 year old male with history of DM, HTN and basal cell scalp lesion here for excision.  ?  Patient seen, examined, and marked?in pre-op by me.?No significant changes in history or physical exam from most recent clinic visit.  ?   Patient voices understanding of intended procedure: primary excision of scalp lesion with possible local rotational flap and indicated procedures  ?   Patient denies taking any blood thinners and has been NPO since mn.  ?   Questions entertained and answered. Patient is ready to proceed.  ?  Shirin Pan MD  [employer] Surgery, PGY-1  9/13/2021 06:17:46  ?   used [id number]

## 2022-05-03 NOTE — HISTORICAL [facility] CERNER
This is a historical note converted from [employer]ner. Formatting and pictures may have been removed.  Please reference [employer]ner for original formatting and attached multimedia. Chief Complaint  PT W CO CP W NV, HA AND RUQ ABD PAIN THAT RAD AROUND TO BACK X 3 DAYS. EKG OBTAINED, HX OF DM   History of Present Illness  This is a 48-year-old  male with past medical history of?traumatic forklift accident with splenic contusions and left rib fractures July 2017, type II diabetes, and hypertension, however, not prescribed any medications for either diagnosis, who presents to [facility] emergency department for right-sided chest pain. ?Patient describes via official  pain as sharp, constant, right-sided with radiation to side, nonreproducible presences clinch injury in the past. ?Pain is not associated with shortness of breath, cough, dyspnea and exertion, PND, orthopnea, bilateral lower extremity anemia, relief with changes in position, palpatations, or syncope,?or cough. Patient states that he has had traumatic crush injury previously, resulting in multiple left-sided rib fractures for?which he is on multiple ER visits.??Chest x-ray consistent with old left-sided rib fractures and abdominal x-ray shows a nonspecific gas pattern. ?EKG normal sinus rhythm with Q waves in II and aVF unchanged from previous EKG. ?POC troponin negative and 1st trended troponin negative, and cholesterol 218. ?Initial blood pressure 157/101 however improved with analgesics.?Ultrasound reveals hepatic steatosis however, patient denies drinking history.  ?  Review of Systems  Constitutional: no weightloss,?fever, chills, weakness or fatigue  Respiratory: No shortness of breath, cough or sputum. No Dyspnea on exertion.  Cardiovascular:?+ chest pain.  Gastrointestinal: no anorexia, nausea, vomiting or diarrhea. No abdominal pain or blood.  Musculoskeletal: No muscle, back pain, joint pain or stiffness.  Integumentary: No rash or  itching.  Neurologic: No headache, dizziness, syncope, paralysis, ataxia, numbness or tingling in the extremities. No change in bowel or bladder control.  All Other ROS_  ?  Physical Exam  Vitals & Measurements  T:?36.7? ?C (Oral)? HR:?92(Peripheral)? HR:?87(Monitored)? RR:?19? BP:?130/86? SpO2:?96%? WT:?92?kg?  General: AAOx3, NAD, alert and cooperative  HEENT: PERRLA, EOMI  Neck: no LAD, no JVD, supple  CVS: S1/S2 nml, RRR, no murmurs, no tenderness to palpation  Resp: CTA b/l, no wheezing  GI: Not distended, not tender, BS+, no guarding  Skin: not jaundiced, warm  Musculoskeletal: normal ROM, no joint tenderness, normal muscular development  Extremities:?no peripheral edema, peripheral pulses intact and equal  Neuro: CN II-XII grossly intact, strength and sensation symmetric and intact throughout, no focal neurological deficits  ?  Assessment/Plan  1. ?Acute chest pain  2.??History type II diabetes  3.??Hypertension  4.??Hx of rib?fractures  5. Hepatic steatosis per abd US  ?  ?   48?year-old man with hisotry of type II diabetes and hypertension, however not on any home?medications who?presented to ED with?chronic?chest pain explained by history of?chest trauma?+6 months ago with?patient blood pressure elevated upon admission, however improved with analgesics which are?when necessary continued inpatient.? Vitals remained stable.??Patient started on aspirin and rosuvastatin.? Patient with history of type II diabetes and hypertension and started on?sliding scale insulin,?A1c pending, CXR pending. With both HEART score 3 and HOWARD 13 patient is low risk.??He wasd?admitted to telemetry with?troponin trended q6h x3?with EKGs. Will?likely discharge patient in AM if he?remains stable and no change in clinical status and EKG and troponin have remained unchanged. ?Will discharge patient on?low-dose ACE inhibitor +/- beta blocker.  ?  ?  ?   Lovenox 40 mg subcu  GI prophylaxis Protonix   Problem List/Past Medical  History  Ongoing  DM2 (diabetes mellitus, type 2)  Gallstones  HTN (hypertension)  Hypertension  Obesity  Historical  No qualifying data  Procedure/Surgical History  Laparoscopy, surgical; cholecystectomy (01/13/2017),  Medications  Inpatient  acetaminophen, 1000 mg= 2 tab(s), Oral, q6hr, PRN  albuterol, 2.5 mg= 3 mL, NEB, q4hr, PRN  aspirin 81 mg oral tablet, CHEWABLE, 81 mg= 1 tab(s), Oral, Daily  Dextrose 50% and Water (50 mL vial/syringe), 25 gm= 50 mL, IV Push, As Directed  Dextrose 50% and Water (50 mL vial/syringe), 12.5 gm= 25 mL, IV Push, As Directed, PRN  Dextrose 50% and Water (50 mL vial/syringe), 12.5 gm= 25 mL, IV Push, Once, PRN  Dextrose 50% in Water intravenous solution, 12.5 gm= 25 mL, IV Push, As Directed, PRN  GI Cocktail - [facility]  glucagon recombinant 1 mg injection, 1 mg= 1 EA, IM, q10min, PRN  glucagon recombinant 1 mg injection, 1 mg= 1 EA, IM, q10min, PRN  glucose 40% oral gel, 15 gm= 0.5 tube(s), Oral, As Directed, PRN  insulin lispro 100 units/mL subcutaneous injection, 3-21 units, Subcutaneous, As Directed, PRN  labetalol, 20 mg= 4 mL, IV Push, q2hr, PRN  Lovenox, 40 mg= 0.4 mL, Subcutaneous, Daily  Protonix, 40 mg= 1 tab(s), Oral, Daily  Zofran, 4 mg= 2 mL, IV Push, q4hr, PRN  Home  traMADol 50 mg oral tablet, 50 mg= 1 tab(s), Oral, q6hr, PRN,? 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 Living situation: Home/Independent. Home equipment: b/p cuff., 09/07/2017  Substance Abuse  Never, 09/07/2017  Tobacco - Denies Tobacco Use, 09/07/2017  Never smoker, 09/07/2017  Family History  Alcoholism.: Father.  Diabetes mellitus type 2: Mother and Brother.  Hypertension.: Brother.  Lab Results  General Lab  BUN: 16 mg/dL (04/09/18 09:00:00)  Creatinine: 0.8 mg/dL (04/09/18 09:00:00)  Glucose Lvl:?274  mg/dL?High (04/09/18 09:00:00)  Hct: 48.6 % (04/09/18 09:00:00)  Hgb: 17.1 gm/dL (04/09/18 09:00:00)  WBC: 5.6 x10(3)/mcL (04/09/18 09:00:00)  Platelet: 217 x10(3)/mcL (04/09/18 09:00:00)  Potassium Lvl: 4 mmol/L (04/09/18 09:00:00)  Sodium Lvl:?134 mmol/L?Low (04/09/18 09:00:00)  ?  Diagnostic Results  Accession:?[id number]  Order:?XR Ribs Left 2 Views  Report Dt/Tm:?04/09/2018 12:21  Report:?  ?  EXAM: XR Ribs Left 2 Views, XR Ribs Right 2 Views  ?  INDICATION: hx fx june 2017;Chest Pain  ?  TECHNIQUE: 4 views of the right ribs and 4 views of the left ribs are  obtained.  ?  COMPARISON: None  ?  FINDINGS: There are remote fracture deformities of multiple left ribs  laterally. No acute fracture is identified. No aggressive lytic or  blastic osseous lesion is seen. Mild degenerative changes are noted  along the spine. The imaged lungs are clear. Surgical clips overlie  the gallbladder fossa.  ?  ?  IMPRESSION:?  No acute osseous abnormality identified. Multiple remote left rib  fractures.  ?  ?  Accession:?[id number]  Order:?XR Abdomen Flat and Erect  Report Dt/Tm:?04/09/2018 10:16  Report:?  2 views of the abdomen.  ?  HISTORY: Abdominal pain.  ?  FINDINGS: Examination reveals some residual feces throughout the colon  the gas pattern is nonspecific with no clear evidence of ileus or  obstruction no abnormal masses or calcifications identified no  evidence of free air.  ?  IMPRESSION: Nonspecific gas pattern  ?  ?  Accession:?[id number]  Order:?US Abdomen Limited  Report Dt/Tm:?04/09/2018 09:49  Report:?  ?  ULTRASOUND ABDOMEN LIMITED:  ?  CLINICAL: Abdominal pain  ?  TECHNIQUE: ?Multiple real-time transverse and longitudinal sections  were performed of the right abdomen by the sonographer. ?Select images  were submitted for review.?  ?  FINDINGS: Generalized hepatic parenchymal increased echogenicity is  considered with steatosis. There was no delineation of discrete  hepatic cystic or solid mass. ?Hepatic  maximum diameter of 17.2 cm.?  There was unremarkable hepatopedal flow within the portal vein.  Pancreas was obscured due to overlying bowel gas. ?There has been  previous surgical resection of the gallbladder. Common bile duct  caliber of 4.1 mm is within normal limits for the age.?  ??  Normally located right kidney length measures 11.0 x 6.0 x 5.5 cm.?  Right renal corticomedullary differentiation is unremarkable. ?No  evidence of hydronephrosis. Inferior vena cava is unremarkable and  visualized abdominal aorta is without aneurysmal dilatation.  ?  IMPRESSION:  ?  1. Hepatic steatosis.  2. Obscured pancreas.  3. Previous cholecystectomy.  ?  ?  ?      Agree with Dr. Montes documentation above. Pt with right lower chest, abdominal pain since accident at work last year. Troponin 0.00, repeat 0.04. EKG with Q waves that were present on previous EKG. Med on call consulted for R/O ACS. Trending troponin and EKG q6hrs. Pt denies any hx of heart issues, no symptomf CHF. Reviewed imaging in the ED. Possible d/c candida.

## 2022-05-03 NOTE — HISTORICAL [facility] CERNER
This is a historical note converted from Sh[place]onne. Formatting and pictures may have been removed.  Please reference Shavonne for original formatting and attached multimedia. Chief Complaint  1 month follow up; right neck pain;RUQ pain 15days ago burning sensation  History of Present Illness  August 20, 2020 Telemedicine Visit: Sinan is a 51 yo  male due for follow up. Last visit in [facility] July 9, 2018. PMhx includes type 2 DM, HTN, HLD, obesity, and CP s/t hx of rib fractures. Recent inpatient admission for pneumonia r/t COVID19 on June 16 and discharged on June 23. Was treated with Z mendez, Rocephin, Decadron, Remdesivir. Attended post pace follow up on July 7, 2020. Denies use of O2 at home after 1.5 weeks discharge. States he is feeling well. His only complaint is c/o right sided neck and shoulder pain. Onset when lying on right side only. Denies any dizziness, weakness, numbness to arm, blurred vision, tinnitus, CP, n/v. Denies any tx because only occurs when lying on right side. Does not have BP machine at present because he is in parking lot. Checking CBGs at home with readings around?120-180. Tolerating Metformin BID and was started on Basaglar 15 units while inpatient. Completed Xarelto as prescribed. Not on ASA. Not taking Crestor. Denies fever, chills, HA, sore throat, blurred vision, tinnitus, dizziness, cough,?CP, SOB, palpitations, wheezing, abdominal pain, poor appetite, loss of taste/ smell, constipation, n/v/d. No other complaints at this time.?  ?  September 29, 2020  Patient here for f/u.  services used for visit. Labs completed with pending all results.?c/o right sided neck. Onset of symptoms when lying on right side. XR?reviewed wither cervical spondylosis. Denies any numbness, tingling, weakness to right arm.?Pain occurs only when lying on right side. Patient is self pay and has no insurance. Denies any tx.?c/o RUQ pain x 15 days. Describes as burning type of pain. /78. A1c  improved to 8.3%. Fasting CBGs at home with readings around 140-190. Denies hypoglycemia episodes. Does admit to drinking a lot of soft drinks. Dm foot/ eye exam today. H/o tobacco abuse, quit age 27 yo. Denies any fever, chills, HA, sore throat, blurred vision, tinnitus, weakness, dizziness, cough, SOB, CP, n/v/d, bloody stools or blood in urine.  Review of Systems  Constitutional: negative except as stated in HPI  Eye: negative except as stated in HPI  ENMT: negative except as stated in HPI  Respiratory: negative except as stated in HPI  Cardiovascular: negative except as stated in HPI  Gastrointestinal: negative except as stated in HPI  Genitourinary: negative except as stated in HPI  Hema/Lymph: negative except as stated in HPI  Endocrine: negative except as stated in HPI  Immunologic: negative except as stated in HPI  Musculoskeletal: negative except as stated in HPI  Integumentary: negative except as stated in HPI  Neurologic: negative except as stated in HPI  ?   All Other ROS_ ?negative except as stated in HPI  Physical Exam  Vitals & Measurements  T:?36.7? ?C (Oral)? HR:?81(Peripheral)? RR:?16? BP:?134/78?  HT:?172.00?cm? WT:?90.090?kg? BMI:?30.45?  General: Alert and oriented. Appropriately dressed.?No acute distress.  Eye: Pupils are equal, round and reactive to light, Extraocular movements are intact.  HENT: Normocephalic. BTM intact without effusion, exudate or erythema. Wearing facial mask.  Neck: Supple, Non-tender. No lymphadenopathy. No thyromegaly, firmness or nodularity.  Respiratory: Lungs are clear to auscultation, Respirations are non-labored, Breath sounds are equal, Symmetrical chest wall expansion.  Cardiovascular: Normal rate, Regular rhythm, No murmur. +2 radial and pedal pulses, bilaterally; without BLE edema.  Gastrointestinal: Soft, mild tenderness to RUQ and midepigastric with palpation, Non-distended, Normal bowel sounds x 4. No palpable masses or hernia.  Musculoskeletal: Cervical  spinous process without tenderness to palpation or obvious deformity. ROM intact. Strength RUE, LUE 5/5.  Integumentary: Warm, Dry, Intact.  DM foot : See documentation.  Neurologic: No focal deficits, Cranial Nerves II-XII are grossly intact.  Psychiatric: Calm. Appropriate mood and affect. Judgment intact with clear thought processes.  Assessment/Plan  1.?RUQ pain?R10.11  c/o RUQ pain, describes as burning sensation x 15 days  follow GERD diet  begin omeprazole 20 mg once daily  pending CMP results from today  Ordered:  omeprazole, 20 mg = 1 cap(s), Oral, Daily, # 30 cap(s), 3 Refill(s), Pharmacy: [facility], 172, cm, Height/Length Dosing, 09/29/20 12:26:00 CDT, 90.09, kg, Weight Dosing, 09/29/20 12:26:00 CDT  ?  2.?Cervical spondylosis?M47.812  XR cervical spine August 20, 2020?  FINDINGS: There is loss of normal cervical lordosis which may be  positional or related to myospasm. Intervertebral disc space  degenerative changes and spondylosis is mostly apparent at C5-C6.  Cervical vertebrae stature and alignment is preserved. There is no  prevertebral soft tissue prominence. No fractures. IMPRESSION: Cervical spondylosis mostly at C5-C6.  patient is self pay and has no insurance  will provide with neck exercises in [place] for patient to engage in at home  educated on proper support while sleeping for supportive pillow and may take with OTC Tylenol for pain relief  if any worsening s/s, notify provider for sooner appt  ?  3.?DM2 (diabetes mellitus, type 2)?E11.9  A1c improved to 8.3% September 29, 2020; Previously was?11.3% Vy 15, 2020  CBG checks as above around 120-180  Educated on ADA diet:  1. Avoid/ decrease high carbohydrate foods (rice, pasta, bread, candy, sweets, carbonated beverages)  Educated on health benefits of?at least 5 days/ week?of 30 minutes moderate intensity exercise (brisk walking) and 2 or more days/ week of muscle strength activities  Avoid alcohol or tobacco use if  applicable  Eye exam: fundus photos today 9/29/2020  Foot exam: 9/29/2020  ACEI/ ARB- begin lisinopril 2.5 mg once daily?  Continue ASA 81 mg once daily; resume statin  Increase Basaglar to 18 units SC q HS. Continue Metformin 1000 mg BID.  Monitor for hypoglycemia episodes; if occurs notify provider.  Ordered:  aspirin, 81 mg = 1 tab(s), Oral, Daily, # 30 tab(s), 3 Refill(s), Pharmacy: [facility], 172, cm, Height/Length Dosing, 09/29/20 12:26:00 CDT, 90.09, kg, Weight Dosing, 09/29/20 12:26:00 CDT  lisinopril, 2.5 mg = 1 tab(s), Oral, Daily, # 30 tab(s), 3 Refill(s), Pharmacy: [facility], 172, cm, Height/Length Dosing, 09/29/20 12:26:00 CDT, 90.09, kg, Weight Dosing, 09/29/20 12:26:00 CDT  metFORMIN, 1,000 mg = 1 tab(s), Oral, BID, with meals, # 60 tab(s), 3 Refill(s), Pharmacy: [facility], 172, cm, Height/Length Dosing, 09/29/20 12:26:00 CDT, 90.09, kg, Weight Dosing, 09/29/20 12:26:00 CDT  rosuvastatin, 40 mg = 1 tab(s), Oral, Once a day (at bedtime), # 30 tab(s), 3 Refill(s), Pharmacy: [facility], 172, cm, Height/Length Dosing, 09/29/20 12:26:00 CDT, 90.09, kg, Weight Dosing, 09/29/20 12:26:00 CDT  Clinic Follow up, *Est. 12/29/20 3:00:00 CST, Order for future visit, DM2 (diabetes mellitus, type 2)  HTN (hypertension)  Elevated alkaline phosphatase level  Hyperlipidemia, [facility]  Comprehensive Metabolic Panel, Routine collect, *Est. 12/29/20 3:00:00 CST, Blood, Order for future visit, *Est. Stop date 12/29/20 3:00:00 CST, Lab Collect, DM2 (diabetes mellitus, type 2), 09/29/20 13:00:00 CDT  Comprehensive Metabolic Panel, Routine collect, 09/29/20 11:49:00 CDT, Blood, Stop date 09/29/20 11:49:00 CDT, Lab Collect, DM2 (diabetes mellitus, type 2)  HTN (hypertension)  Obesity  Well adult exam, 09/29/20 11:49:00 CDT  Ferritin, Routine collect, 09/29/20 11:49:00 CDT, Blood, Stop date 09/29/20 11:49:00 CDT, Lab Collect, DM2  (diabetes mellitus, type 2)  HTN (hypertension)  Obesity  Well adult exam, 09/29/20 11:49:00 CDT  Hemoglobin A1C [facility], Routine collect, *Est. 12/29/20 3:00:00 CST, Blood, Order for future visit, *Est. Stop date 12/29/20 3:00:00 CST, Lab Collect, DM2 (diabetes mellitus, type 2), 09/29/20 13:00:00 CDT  Hepatitis Panel [facility], Routine collect, 09/29/20 11:49:00 CDT, Blood, Stop date 09/29/20 11:49:00 CDT, Lab Collect, DM2 (diabetes mellitus, type 2)  HTN (hypertension)  Obesity  Well adult exam, 09/29/20 11:49:00 CDT  HIV 1 and 2, Routine collect, 09/29/20 11:49:00 CDT, Blood, Stop date 09/29/20 11:49:00 CDT, Lab Collect, DM2 (diabetes mellitus, type 2)  HTN (hypertension)  Obesity  Well adult exam, 09/29/20 11:49:00 CDT  Lipid Panel, Routine collect, 09/29/20 11:49:00 CDT, Blood, Stop date 09/29/20 11:49:00 CDT, Lab Collect, DM2 (diabetes mellitus, type 2)  HTN (hypertension)  Obesity  Well adult exam, 09/29/20 11:49:00 CDT  Microalbum/Creatinine Ratio Urine (Microalb/Creat), Routine collect, Urine, Order for future visit, *Est. 12/29/20 3:00:00 CST, *Est. Stop date 12/29/20 3:00:00 CST, Nurse collect, DM2 (diabetes mellitus, type 2)  Protein/Creatinine Ratio Urine, Routine collect, Urine, 09/29/20 11:49:00 CDT, Stop date 09/29/20 11:49:00 CDT, Nurse collect, DM2 (diabetes mellitus, type 2)  HTN (hypertension)  Obesity  Well adult exam  Thyroid Stimulating Hormone, Routine collect, 09/29/20 11:49:00 CDT, Blood, Stop date 09/29/20 11:49:00 CDT, Lab Collect, DM2 (diabetes mellitus, type 2)  HTN (hypertension)  Obesity  Well adult exam, 09/29/20 11:49:00 CDT  ?  4.?HTN (hypertension)?I10  /78  Educated on low sodium diet  Educated to avoid alcohol or tobacco use if applicable  Educated on health benefits of?at least 5 days/ week?of 30 minutes moderate intensity exercise (brisk walking) and 2 or more days/ week of muscle strength activities  Continue current medication regimen; begin lisinopril for  renoprotective benefits  Ordered:  aspirin, 81 mg = 1 tab(s), Oral, Daily, # 30 tab(s), 3 Refill(s), Pharmacy: [facility], 172, cm, Height/Length Dosing, 09/29/20 12:26:00 CDT, 90.09, kg, Weight Dosing, 09/29/20 12:26:00 CDT  lisinopril, 2.5 mg = 1 tab(s), Oral, Daily, # 30 tab(s), 3 Refill(s), Pharmacy: [facility], 172, cm, Height/Length Dosing, 09/29/20 12:26:00 CDT, 90.09, kg, Weight Dosing, 09/29/20 12:26:00 CDT  Clinic Follow up, *Est. 12/29/20 3:00:00 CST, Order for future visit, DM2 (diabetes mellitus, type 2)  HTN (hypertension)  Elevated alkaline phosphatase level  Hyperlipidemia, [facility]  Comprehensive Metabolic Panel, Routine collect, 09/29/20 11:49:00 CDT, Blood, Stop date 09/29/20 11:49:00 CDT, Lab Collect, DM2 (diabetes mellitus, type 2)  HTN (hypertension)  Obesity  Well adult exam, 09/29/20 11:49:00 CDT  Ferritin, Routine collect, 09/29/20 11:49:00 CDT, Blood, Stop date 09/29/20 11:49:00 CDT, Lab Collect, DM2 (diabetes mellitus, type 2)  HTN (hypertension)  Obesity  Well adult exam, 09/29/20 11:49:00 CDT  Hepatitis Panel [facility], Routine collect, 09/29/20 11:49:00 CDT, Blood, Stop date 09/29/20 11:49:00 CDT, Lab Collect, DM2 (diabetes mellitus, type 2)  HTN (hypertension)  Obesity  Well adult exam, 09/29/20 11:49:00 CDT  HIV 1 and 2, Routine collect, 09/29/20 11:49:00 CDT, Blood, Stop date 09/29/20 11:49:00 CDT, Lab Collect, DM2 (diabetes mellitus, type 2)  HTN (hypertension)  Obesity  Well adult exam, 09/29/20 11:49:00 CDT  Lipid Panel, Routine collect, 09/29/20 11:49:00 CDT, Blood, Stop date 09/29/20 11:49:00 CDT, Lab Collect, DM2 (diabetes mellitus, type 2)  HTN (hypertension)  Obesity  Well adult exam, 09/29/20 11:49:00 CDT  Protein/Creatinine Ratio Urine, Routine collect, Urine, 09/29/20 11:49:00 CDT, Stop date 09/29/20 11:49:00 CDT, Nurse collect, DM2 (diabetes mellitus, type 2)  HTN (hypertension)  Obesity  Well adult  exam  Thyroid Stimulating Hormone, Routine collect, 09/29/20 11:49:00 CDT, Blood, Stop date 09/29/20 11:49:00 CDT, Lab Collect, DM2 (diabetes mellitus, type 2)  HTN (hypertension)  Obesity  Well adult exam, 09/29/20 11:49:00 CDT  ?  5.?Elevated alkaline phosphatase level?R74.8  Alk phos elevated while inpatient  prior CMP with LFT wnl  Abdominal US Vy 15, 2020 with hepatomegaly and hepatic steatosis  per discharge note, recommended outpatient testing for further testing, ordered-- labs completed today- pending results  Ordered:  Actin (Smooth Muscle) Antibody-[facility] [id number], Routine collect, 09/29/20 11:49:00 CDT, Blood, Stop date 09/29/20 11:49:00 CDT, Lab Collect, Elevated alkaline phosphatase level, 09/29/20 11:49:00 CDT  Clinic Follow up, *Est. 12/29/20 3:00:00 CST, Order for future visit, DM2 (diabetes mellitus, type 2)  HTN (hypertension)  Elevated alkaline phosphatase level  Hyperlipidemia, [facility]  ?  6.?Obesity?E66.9  BMI 30.45  Educated on health benefits of?at least 5 days/ week?of 30 minutes moderate intensity exercise (brisk walking) and 2 or more days/ week of muscle strength activities  Educated on low fat/carb diet  Ordered:  Comprehensive Metabolic Panel, Routine collect, 09/29/20 11:49:00 CDT, Blood, Stop date 09/29/20 11:49:00 CDT, Lab Collect, DM2 (diabetes mellitus, type 2)  HTN (hypertension)  Obesity  Well adult exam, 09/29/20 11:49:00 CDT  Ferritin, Routine collect, 09/29/20 11:49:00 CDT, Blood, Stop date 09/29/20 11:49:00 CDT, Lab Collect, DM2 (diabetes mellitus, type 2)  HTN (hypertension)  Obesity  Well adult exam, 09/29/20 11:49:00 CDT  Hepatitis Panel [facility], Routine collect, 09/29/20 11:49:00 CDT, Blood, Stop date 09/29/20 11:49:00 CDT, Lab Collect, DM2 (diabetes mellitus, type 2)  HTN (hypertension)  Obesity  Well adult exam, 09/29/20 11:49:00 CDT  HIV 1 and 2, Routine collect, 09/29/20 11:49:00 CDT, Blood, Stop date 09/29/20 11:49:00 CDT, Lab Collect, DM2  (diabetes mellitus, type 2)  HTN (hypertension)  Obesity  Well adult exam, 09/29/20 11:49:00 CDT  Lipid Panel, Routine collect, 09/29/20 11:49:00 CDT, Blood, Stop date 09/29/20 11:49:00 CDT, Lab Collect, DM2 (diabetes mellitus, type 2)  HTN (hypertension)  Obesity  Well adult exam, 09/29/20 11:49:00 CDT  Protein/Creatinine Ratio Urine, Routine collect, Urine, 09/29/20 11:49:00 CDT, Stop date 09/29/20 11:49:00 CDT, Nurse collect, DM2 (diabetes mellitus, type 2)  HTN (hypertension)  Obesity  Well adult exam  Thyroid Stimulating Hormone, Routine collect, 09/29/20 11:49:00 CDT, Blood, Stop date 09/29/20 11:49:00 CDT, Lab Collect, DM2 (diabetes mellitus, type 2)  HTN (hypertension)  Obesity  Well adult exam, 09/29/20 11:49:00 CDT  ?  7.?Well adult exam?Z00.00  Health Maintenance:  CRC- FIT ordered  PSA- ordered- pending  AAA- H/o tobacco abuse, quit age 27 yo.  ?   Family History negative.  ?   Vaccines:  Influenza- 12/13/18  Pneumonia-  Tdap-  Ordered:  Comprehensive Metabolic Panel, Routine collect, 09/29/20 11:49:00 CDT, Blood, Stop date 09/29/20 11:49:00 CDT, Lab Collect, DM2 (diabetes mellitus, type 2)  HTN (hypertension)  Obesity  Well adult exam, 09/29/20 11:49:00 CDT  Ferritin, Routine collect, 09/29/20 11:49:00 CDT, Blood, Stop date 09/29/20 11:49:00 CDT, Lab Collect, DM2 (diabetes mellitus, type 2)  HTN (hypertension)  Obesity  Well adult exam, 09/29/20 11:49:00 CDT  Hepatitis Panel [facility], Routine collect, 09/29/20 11:49:00 CDT, Blood, Stop date 09/29/20 11:49:00 CDT, Lab Collect, DM2 (diabetes mellitus, type 2)  HTN (hypertension)  Obesity  Well adult exam, 09/29/20 11:49:00 CDT  HIV 1 and 2, Routine collect, 09/29/20 11:49:00 CDT, Blood, Stop date 09/29/20 11:49:00 CDT, Lab Collect, DM2 (diabetes mellitus, type 2)  HTN (hypertension)  Obesity  Well adult exam, 09/29/20 11:49:00 CDT  Lipid Panel, Routine collect, 09/29/20 11:49:00 CDT, Blood, Stop date 09/29/20 11:49:00 CDT, Lab  Collect, DM2 (diabetes mellitus, type 2)  HTN (hypertension)  Obesity  Well adult exam, 09/29/20 11:49:00 CDT  Protein/Creatinine Ratio Urine, Routine collect, Urine, 09/29/20 11:49:00 CDT, Stop date 09/29/20 11:49:00 CDT, Nurse collect, DM2 (diabetes mellitus, type 2)  HTN (hypertension)  Obesity  Well adult exam  Thyroid Stimulating Hormone, Routine collect, 09/29/20 11:49:00 CDT, Blood, Stop date 09/29/20 11:49:00 CDT, Lab Collect, DM2 (diabetes mellitus, type 2)  HTN (hypertension)  Obesity  Well adult exam, 09/29/20 11:49:00 CDT  ?  Orders:  amLODIPine, 10 mg = 1 tab(s), Oral, Daily, # 30 tab(s), 3 Refill(s), Pharmacy: [facility], 172, cm, Height/Length Dosing, 09/29/20 12:26:00 CDT, 90.09, kg, Weight Dosing, 09/29/20 12:26:00 CDT  insulin glargine, 18 units, Subcutaneous, Once a day (at bedtime), # 10 mL, 3 Refill(s), Pharmacy: [facility], 172, cm, Height/Length Dosing, 09/29/20 12:26:00 CDT, 90.09, kg, Weight Dosing, 09/29/20 12:26:00 CDT  Misc Prescription, Lancets, See Instructions, Use twice daily for CBG checks Dx: E11.9, # 200 EA, 3 Refill(s), Pharmacy: [facility], 172, cm, Height/Length Dosing, 09/29/20 12:26:00 CDT, 90.09, kg, Weight Dosing, 09/29/20 12:26:00 CDT  Misc Prescription, Glucometer Test Strips, See Instructions, Use twice daily for CBG checks Dx: E11.9, # 200 EA, 3 Refill(s), Pharmacy: [facility], 172, cm, Height/Length Dosing, 09/29/20 12:26:00 CDT, 90.09, kg, Weight Dosing, 09/29/20 12:26:00 CDT  Misc Prescription, Pen Needles, See Instructions, Use daily for Insulin administration Dx: e11.9, # 100 EA, 3 Refill(s), Pharmacy: [facility], 172, cm, Height/Length Dosing, 09/29/20 12:26:00 CDT, 90.09, kg, Weight Dosing, 09/29/20 12:26:00 CDT  Occult Blood Fecal Immunoassay, Routine collect, Stool, Order for future visit, *Est. 09/29/20 3:00:00 CDT, *Est. Stop date 09/29/20 3:00:00  CDT, Nurse collect, Screening for colon cancer  Prostate Specific Antigen, Routine collect, 09/29/20 11:49:00 CDT, Blood, Stop date 09/29/20 11:49:00 CDT, Lab Collect, 09/29/20 11:49:00 CDT  RTC in 3 mo with labs.  If at any time condition worsens or experience new symptoms/ concerns, please call clinic for sooner appointment or go to ED/UCC.  Referrals  [facility], Specialty: Ophthalmology, Reason: Fundus photos, Refer To: Referral, Fundus Exam, [facility], [address] [place] [address]., Start: 09/29/20 12:35:00 CDT  Clinic Follow up, *Est. 12/29/20 3:00:00 CST, Order for future visit, DM2 (diabetes mellitus, type 2)  HTN (hypertension)  Elevated alkaline phosphatase level  Hyperlipidemia, [facility]   Problem List/Past Medical History  Ongoing  DM2 (diabetes mellitus, type 2)  History of tobacco abuse  HTN (hypertension)  Obesity  Historical  2019-nCoV  Chest pain  Gallstones  Procedure/Surgical History  Biopsy Gastrointestinal (05/02/2017)  Esophagogastroduodenoscopy (05/02/2017)  Esophagogastroduodenoscopy, flexible, transoral; with biopsy, single or multiple (05/02/2017)  Excision of Stomach, Via Natural or Artificial Opening Endoscopic, Diagnostic (05/02/2017)  Cholecystectomy Laparoscopic (None) (01/13/2017)  Laparoscopy, surgical; cholecystectomy (01/13/2017)  Resection of Gallbladder, Percutaneous Endoscopic Approach (01/13/2017)  Cholecystectomy   Medications  amlodipine 10 mg oral tablet, 10 mg= 1 tab(s), Oral, Daily, 3 refills  aspirin 81 mg oral Delayed Release (EC) tablet, 81 mg= 1 tab(s), Oral, Daily, 3 refills  Basaglar KwikPen 100 units/mL subcutaneous solution, 18 units, Subcutaneous, Once a day (at bedtime), 3 refills  blood glucose monitor, See Instructions  Crestor 40 mg oral tablet, 40 mg= 1 tab(s), Oral, Once a day (at bedtime), 3 refills  Glucometer Test Strips, See Instructions, 3 refills  home oxygen, See  Instructions,? ?Not taking: Last Dose Date/Time Unknown  Lancets, See Instructions, 3 refills  lisinopril 2.5 mg oral tablet, 2.5 mg= 1 tab(s), Oral, Daily, 3 refills  metFORMIN 1000 mg oral tablet, 1000 mg= 1 tab(s), Oral, BID, 3 refills  omeprazole 20 mg oral DR capsule, 20 mg= 1 cap(s), Oral, Daily, 3 refills  Pen Needles, See Instructions, 3 refills  Allergies  No Known Allergies  Social History  Abuse/Neglect  No, 09/29/2020  Alcohol  Never, Alcohol use interferes with work or home: No. Others hurt by drinking: No. 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 Living situation: Home/Independent. Home equipment: b/p cuff., 04/11/2017  Nutrition/Health  Regular, 06/04/2018  Sexual  Substance Use  Never, 10/27/2016  Tobacco - Denies Tobacco Use, 02/12/2016  Smoker, current status unknown, N/A, 28 Years (Age stopped)., 09/29/2020  Family History  Alcoholism.: Father.  Diabetes mellitus type 2: Mother and Brother.  Hypertension.: Brother.  Immunizations  Vaccine Date Status   influenza virus vaccine, inactivated 12/13/2018 Recorded   Health Maintenance  Health Maintenance  ???Pending?(in the next year)  ??? ??OverDue  ??? ? ? ?Diabetes Maintenance-Microalbumin due??and every?  ??? ??Due?  ??? ? ? ?Colorectal Screening due??09/29/20??and every?  ??? ? ? ?Diabetes Maintenance-Eye Exam due??09/29/20??and every?  ??? ? ? ?Diabetes Maintenance-Foot Exam due??09/29/20??and every?  ??? ? ? ?Influenza Vaccine due??09/29/20??and every?  ??? ? ? ?Tetanus Vaccine due??09/29/20??and every 10??year(s)  ??? ? ? ?Zoster Vaccine due??09/29/20??and every?  ??? ??Due In Future?  ??? ? ? ?Obesity Screening not due until??01/01/21??and every 1??year(s)  ??? ? ? ?Alcohol Misuse Screening not due until??01/02/21??and every 1??year(s)  ??? ? ? ?Hypertension Management-BMP not  due until??06/23/21??and every 1??year(s)  ???Satisfied?(in the past 1 year)  ??? ??Satisfied?  ??? ? ? ?ADL Screening on??09/29/20.??Satisfied by Jasmin Silveira LPN  ??? ? ? ?Alcohol Misuse Screening on??09/29/20.??Satisfied by Jasmin Silveira LPN  ??? ? ? ?Aspirin Therapy for CVD Prevention on??09/29/20.??Satisfied by Virginia ANGUIANO-Mariangel CONLEY.  ??? ? ? ?Blood Pressure Screening on??09/29/20.??Satisfied by Jasmin Silveira LPN  ??? ? ? ?Body Mass Index Check on??09/29/20.??Satisfied by Jasmin Silveira LPN  ??? ? ? ?Depression Screening on??09/29/20.??Satisfied by Jasmin Silveira LPN  ??? ? ? ?Diabetes Maintenance-Foot Exam on??09/29/20.??Satisfied by Mariangel West.  ??? ? ? ?Diabetes Maintenance-HgbA1c on??09/29/20.??Satisfied by Lisa Lopes  ??? ? ? ?Diabetes Screening on??09/29/20.??Satisfied by Lisa Lopes  ??? ? ? ?Hypertension Management-Blood Pressure on??09/29/20.??Satisfied by Jasmin Silveira LPN  ??? ? ? ?Obesity Screening on??09/29/20.??Satisfied by Jasmin Silveira LPN  ?  Lab Results  Test Name Test Result Date/Time    mg/dL (High) 09/29/2020 11:58 CDT   Hgb A1c 8.3 % (High) 09/29/2020 11:58 CDT   WBC 6.6 x10(3)/mcL 09/29/2020 11:58 CDT   RBC 5.31 x10(6)/mcL 09/29/2020 11:58 CDT   Hgb 15.8 gm/dL 09/29/2020 11:58 CDT   Hct 44.7 % 09/29/2020 11:58 CDT   Platelet 229 x10(3)/mcL 09/29/2020 11:58 CDT   MCV 84.2 fL 09/29/2020 11:58 CDT   MCH 29.8 pg 09/29/2020 11:58 CDT   MCHC 35.3 gm/dL 09/29/2020 11:58 CDT   RDW 12.5 % 09/29/2020 11:58 CDT   MPV 10.3 fL 09/29/2020 11:58 CDT   Abs Neut 3.57 x10(3)/mcL 09/29/2020 11:58 CDT   Neutro Auto 54 % 09/29/2020 11:58 CDT   Lymph Auto 35 % 09/29/2020 11:58 CDT   Mono Auto 7 % 09/29/2020 11:58 CDT   Eos Auto 3 % 09/29/2020 11:58 CDT   Abs Eos 0.2 x10(3)/mcL 09/29/2020 11:58 CDT   Basophil Auto 0 % 09/29/2020 11:58 CDT   Abs Neutro 3.57 x10(3)/mcL 09/29/2020 11:58 CDT   Abs Lymph 2.3 x10(3)/mcL 09/29/2020 11:58 CDT   Abs Mono 0.5 x10(3)/mcL  09/29/2020 11:58 CDT   Abs Baso 0.0 x10(3)/mcL 09/29/2020 11:58 CDT   IG% 0 % 09/29/2020 11:58 CDT   IG# 0.0100 09/29/2020 11:58 CDT

## 2022-06-10 ENCOUNTER — LAB VISIT (OUTPATIENT)
Dept: LAB | Facility: HOSPITAL | Age: 52
End: 2022-06-10
Attending: NURSE PRACTITIONER

## 2022-06-10 DIAGNOSIS — E11.9 DM (DIABETES MELLITUS): Primary | ICD-10-CM

## 2022-06-10 LAB
ALBUMIN SERPL-MCNC: 4.4 GM/DL (ref 3.5–5)
ALBUMIN/GLOB SERPL: 1.4 RATIO (ref 1.1–2)
ALP SERPL-CCNC: 80 UNIT/L (ref 40–150)
ALT SERPL-CCNC: 49 UNIT/L (ref 0–55)
AST SERPL-CCNC: 35 UNIT/L (ref 5–34)
BILIRUBIN DIRECT+TOT PNL SERPL-MCNC: 0.7 MG/DL
BUN SERPL-MCNC: 13 MG/DL (ref 8.4–25.7)
CALCIUM SERPL-MCNC: 9.4 MG/DL (ref 8.4–10.2)
CHLORIDE SERPL-SCNC: 105 MMOL/L (ref 98–107)
CO2 SERPL-SCNC: 26 MMOL/L (ref 22–29)
CREAT SERPL-MCNC: 0.76 MG/DL (ref 0.73–1.18)
EST. AVERAGE GLUCOSE BLD GHB EST-MCNC: 214.5 MG/DL
GLOBULIN SER-MCNC: 3.2 GM/DL (ref 2.4–3.5)
GLUCOSE SERPL-MCNC: 107 MG/DL (ref 74–100)
HBA1C MFR BLD: 9.1 %
POTASSIUM SERPL-SCNC: 3.7 MMOL/L (ref 3.5–5.1)
PROT SERPL-MCNC: 7.6 GM/DL (ref 6.4–8.3)
SODIUM SERPL-SCNC: 142 MMOL/L (ref 136–145)

## 2022-06-10 PROCEDURE — 83036 HEMOGLOBIN GLYCOSYLATED A1C: CPT

## 2022-06-10 PROCEDURE — 36415 COLL VENOUS BLD VENIPUNCTURE: CPT

## 2022-06-10 PROCEDURE — 80053 COMPREHEN METABOLIC PANEL: CPT

## 2022-06-15 ENCOUNTER — HOSPITAL ENCOUNTER (OUTPATIENT)
Dept: RADIOLOGY | Facility: HOSPITAL | Age: 52
Discharge: HOME | End: 2022-06-15
Attending: NURSE PRACTITIONER

## 2022-06-15 ENCOUNTER — OFFICE VISIT (OUTPATIENT)
Dept: INTERNAL MEDICINE | Facility: CLINIC | Age: 52
End: 2022-06-15

## 2022-06-15 VITALS
TEMPERATURE: 98 F | DIASTOLIC BLOOD PRESSURE: 89 MMHG | RESPIRATION RATE: 20 BRPM | WEIGHT: 203.19 LBS | SYSTOLIC BLOOD PRESSURE: 134 MMHG | HEART RATE: 86 BPM | HEIGHT: 65 IN | BODY MASS INDEX: 33.85 KG/M2

## 2022-06-15 DIAGNOSIS — E66.9 OBESITY, UNSPECIFIED CLASSIFICATION, UNSPECIFIED OBESITY TYPE, UNSPECIFIED WHETHER SERIOUS COMORBIDITY PRESENT: ICD-10-CM

## 2022-06-15 DIAGNOSIS — M54.32 LEFT SIDED SCIATICA: ICD-10-CM

## 2022-06-15 DIAGNOSIS — E11.9 TYPE 2 DIABETES MELLITUS WITHOUT COMPLICATION, WITHOUT LONG-TERM CURRENT USE OF INSULIN: ICD-10-CM

## 2022-06-15 DIAGNOSIS — E78.5 HYPERLIPIDEMIA, UNSPECIFIED HYPERLIPIDEMIA TYPE: ICD-10-CM

## 2022-06-15 DIAGNOSIS — L20.9 ATOPIC DERMATITIS, UNSPECIFIED TYPE: ICD-10-CM

## 2022-06-15 DIAGNOSIS — I10 HYPERTENSION, UNSPECIFIED TYPE: Primary | ICD-10-CM

## 2022-06-15 PROBLEM — C44.91 BASAL CELL CARCINOMA (BCC): Status: ACTIVE | Noted: 2022-06-15

## 2022-06-15 PROBLEM — F17.200 TOBACCO DEPENDENCY: Status: ACTIVE | Noted: 2022-06-15

## 2022-06-15 PROBLEM — M47.812 CERVICAL SPONDYLOSIS: Status: ACTIVE | Noted: 2022-06-15

## 2022-06-15 PROCEDURE — 99214 OFFICE O/P EST MOD 30 MIN: CPT | Mod: PBBFAC | Performed by: NURSE PRACTITIONER

## 2022-06-15 PROCEDURE — 72110 X-RAY EXAM L-2 SPINE 4/>VWS: CPT | Mod: TC

## 2022-06-15 PROCEDURE — 99214 OFFICE O/P EST MOD 30 MIN: CPT | Mod: S$PBB,,, | Performed by: NURSE PRACTITIONER

## 2022-06-15 PROCEDURE — 99214 PR OFFICE/OUTPT VISIT, EST, LEVL IV, 30-39 MIN: ICD-10-PCS | Mod: S$PBB,,, | Performed by: NURSE PRACTITIONER

## 2022-06-15 RX ORDER — METFORMIN HYDROCHLORIDE 1000 MG/1
1000 TABLET ORAL 2 TIMES DAILY WITH MEALS
COMMUNITY
Start: 2022-05-09 | End: 2022-06-15 | Stop reason: SDUPTHER

## 2022-06-15 RX ORDER — AMLODIPINE BESYLATE 10 MG/1
10 TABLET ORAL DAILY
COMMUNITY
Start: 2022-05-09 | End: 2022-06-15 | Stop reason: SDUPTHER

## 2022-06-15 RX ORDER — GLIPIZIDE 5 MG/1
5 TABLET ORAL 2 TIMES DAILY WITH MEALS
Qty: 180 TABLET | Refills: 1 | Status: SHIPPED | OUTPATIENT
Start: 2022-06-15 | End: 2022-12-15 | Stop reason: SDUPTHER

## 2022-06-15 RX ORDER — ROSUVASTATIN CALCIUM 40 MG/1
40 TABLET, COATED ORAL NIGHTLY
Qty: 90 TABLET | Refills: 1 | Status: SHIPPED | OUTPATIENT
Start: 2022-06-15 | End: 2022-12-15 | Stop reason: SDUPTHER

## 2022-06-15 RX ORDER — MELOXICAM 15 MG/1
15 TABLET ORAL DAILY
Qty: 30 TABLET | Refills: 1 | Status: SHIPPED | OUTPATIENT
Start: 2022-06-15 | End: 2022-09-15 | Stop reason: SDUPTHER

## 2022-06-15 RX ORDER — ASPIRIN 81 MG/1
81 TABLET ORAL
COMMUNITY
Start: 2021-08-04 | End: 2022-12-15 | Stop reason: SDUPTHER

## 2022-06-15 RX ORDER — GLIPIZIDE 5 MG/1
TABLET ORAL
COMMUNITY
Start: 2022-05-09 | End: 2022-06-15 | Stop reason: SDUPTHER

## 2022-06-15 RX ORDER — OMEPRAZOLE 20 MG/1
20 CAPSULE, DELAYED RELEASE ORAL DAILY
COMMUNITY
Start: 2022-05-09 | End: 2022-06-15 | Stop reason: SDUPTHER

## 2022-06-15 RX ORDER — INSULIN GLARGINE 100 [IU]/ML
INJECTION, SOLUTION SUBCUTANEOUS
COMMUNITY
Start: 2022-04-18 | End: 2022-06-15

## 2022-06-15 RX ORDER — METFORMIN HYDROCHLORIDE 1000 MG/1
1000 TABLET ORAL 2 TIMES DAILY WITH MEALS
Qty: 180 TABLET | Refills: 1 | Status: SHIPPED | OUTPATIENT
Start: 2022-06-15 | End: 2022-12-15 | Stop reason: SDUPTHER

## 2022-06-15 RX ORDER — INSULIN GLARGINE 100 [IU]/ML
38 INJECTION, SOLUTION SUBCUTANEOUS NIGHTLY
Qty: 15 ML | Refills: 3 | Status: SHIPPED | OUTPATIENT
Start: 2022-06-15 | End: 2022-12-15 | Stop reason: SDUPTHER

## 2022-06-15 RX ORDER — LISINOPRIL 2.5 MG/1
2.5 TABLET ORAL DAILY
Qty: 90 TABLET | Refills: 1 | Status: SHIPPED | OUTPATIENT
Start: 2022-06-15 | End: 2022-12-15 | Stop reason: SDUPTHER

## 2022-06-15 RX ORDER — LISINOPRIL 2.5 MG/1
2.5 TABLET ORAL DAILY
COMMUNITY
Start: 2022-05-09 | End: 2022-06-15 | Stop reason: SDUPTHER

## 2022-06-15 RX ORDER — ROSUVASTATIN CALCIUM 40 MG/1
40 TABLET, COATED ORAL NIGHTLY
COMMUNITY
Start: 2022-05-09 | End: 2022-06-15 | Stop reason: SDUPTHER

## 2022-06-15 RX ORDER — PEN NEEDLE, DIABETIC 32GX 5/32"
NEEDLE, DISPOSABLE MISCELLANEOUS
COMMUNITY
Start: 2022-03-15 | End: 2022-12-15 | Stop reason: SDUPTHER

## 2022-06-15 RX ORDER — TRIAMCINOLONE ACETONIDE 1 MG/G
CREAM TOPICAL 2 TIMES DAILY
Qty: 30 G | Refills: 1 | Status: SHIPPED | OUTPATIENT
Start: 2022-06-15

## 2022-06-15 RX ORDER — AMLODIPINE BESYLATE 10 MG/1
10 TABLET ORAL DAILY
Qty: 90 TABLET | Refills: 1 | Status: SHIPPED | OUTPATIENT
Start: 2022-06-15 | End: 2022-12-15 | Stop reason: SDUPTHER

## 2022-06-15 RX ORDER — OMEPRAZOLE 20 MG/1
20 CAPSULE, DELAYED RELEASE ORAL DAILY
Qty: 90 CAPSULE | Refills: 1 | Status: SHIPPED | OUTPATIENT
Start: 2022-06-15 | End: 2022-12-15 | Stop reason: SDUPTHER

## 2022-06-15 NOTE — ASSESSMENT & PLAN NOTE
A1c worsened to 9.1%, Prior A1c: 8.8%, 7.9%, 9.2%, 11.3%  Patient has not been taking medications as prescribed. Out of Glipizide ?   Hypoglycemia: 70-80s in the mornings   Educated on ADA diet:  1. Avoid/ decrease high carbohydrate foods (rice, pasta, bread, candy, sweets, carbonated beverages)  Educated on health benefits of at least 5 days/ week of 30 minutes moderate intensity exercise (brisk walking) and 2 or more days/ week of muscle strength activities  Avoid alcohol or tobacco use if applicable  Eye exam: 12/15/21 no DR  Foot exam: 9/15/21  ACEI/ ARB- lisinopril 2.5 mg once daily   Continue ASA 81 mg once daily and statin  Continue medications as prescribed.  Decrease Basaglar insulin to 38 units due to hypoglycemia.   Questionable compliance with Glipizide as he states he was out for a while after taking BID however most recently he states he is only taking once daily due to lower blood sugars. Advised to check with pharmacy and ask for clarification regarding prescription instructions and associated diagnoses.   RTC 3 mo with labs

## 2022-06-15 NOTE — ASSESSMENT & PLAN NOTE
C/o left sided sciatic pain x 2 months with paresthesias/ weakness to b/l legs  Denies any recent injury/ trauma. 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   Encouraged regular exercises, warm heat/ ice therapy as needed x 15-20 minutes and Rx Meloxicam PRN

## 2022-06-15 NOTE — ASSESSMENT & PLAN NOTE
BMI 33.81  Educated on increased risk of disease s/t obesity.  Educated on health benefits of at least 5 days/ week of 30 minutes moderate intensity exercise (brisk walking) and 2 or more days/ week of muscle strength activities (as tolerated).  Eat well balanced diet of fresh fruits/ vegetables, whole grains, lean meats and limit high carbohydrate foods.

## 2022-06-15 NOTE — PROGRESS NOTES
Mariangel L Virginia, NP   [facility]  [address] [facility] [address]      PATIENT NAME: Sinan Arreola  : 1970  DATE: 6/15/22  MRN: [id number]      Billing Provider: Mariangel Coleman NP  Level of Service:   Patient PCP Information     Provider PCP Type    Mariangel Coleman NP General          Reason for Visit / Chief Complaint: Follow-up and Leg Pain (Left buttock down to ankle)       History of Present Illness / Problem Focused Workflow     Sinan Arreola presents to the clinic with Follow-up and Leg Pain (Left buttock down to ankle)     /89. A1c 9.1%. He was taking Glipizide BID and ran out but then he states he has most recently been taking once daily due to lower blood sugars. He reports sugars around 70-80 in the morning. C/o left leg pain from buttock down lower leg. Denies numbness/ tingling/ weakness. Denies any recent injury. Admits to injury sustained from forklift in 2017. Taking OTC Tylenol with minimal relief. Also reports rash to neck. Denies any other concerns/ complaints.       Review of Systems     Review of Systems   Constitutional: Negative for appetite change, chills, fatigue, fever and unexpected weight change.   HENT: Negative for congestion, ear pain, hearing loss, sinus pressure, sore throat and tinnitus.    Respiratory: Negative for cough, chest tightness, shortness of breath and wheezing.    Cardiovascular: Negative for chest pain, palpitations and leg swelling.   Gastrointestinal: Negative for abdominal distention, abdominal pain, blood in stool, constipation, diarrhea, nausea and vomiting.   Genitourinary: Negative for dysuria and hematuria.   Musculoskeletal: Positive for back pain (left sided sciatic ). Negative for arthralgias and myalgias.   Skin: Positive for rash (neck). Negative for pallor.   Allergic/Immunologic: Negative for environmental allergies.   Neurological:  "Negative for dizziness, syncope, weakness, numbness and headaches.   Hematological: Negative for adenopathy. Does not bruise/bleed easily.   Psychiatric/Behavioral: Negative for agitation, behavioral problems, confusion, hallucinations, sleep disturbance and suicidal ideas. The patient is not nervous/anxious.        Medical / Social / Family History   No past medical history on file.    Past Surgical History:   Procedure Laterality Date    CHOLECYSTECTOMY      SKIN CANCER EXCISION  2021    head       Social History  Mr. Menon  reports that he has never smoked. He has never used smokeless tobacco. He reports previous alcohol use. He reports that he does not use drugs.    Family History  Mr. Menon's Family history is unknown by patient.    Medications and Allergies     Medications  Outpatient Medications Marked as Taking for the 6/15/22 encounter (Office Visit) with Mariangel Coleman NP   Medication Sig Dispense Refill    aspirin (ECOTRIN) 81 MG EC tablet Take 81 mg by mouth.      TECHLITE PEN NEEDLE 32 gauge x 5/32" Ndle USE DAILY FOR INSULIN ADMINISTRATION      [DISCONTINUED] amLODIPine (NORVASC) 10 MG tablet Take 10 mg by mouth once daily.      [DISCONTINUED] glipiZIDE (GLUCOTROL) 5 MG tablet TAKE 1 TABLET BY MOUTH DAILY 30 MINUTES BEFORE BREAKFAST      [DISCONTINUED] LANTUS SOLOSTAR U-100 INSULIN glargine 100 units/mL (3mL) SubQ pen INJECT 40 UNITS UNDER THE SKIN DAILY AT BEDTIME. ROTATE INJECTION SITES      [DISCONTINUED] lisinopriL (PRINIVIL,ZESTRIL) 2.5 MG tablet Take 2.5 mg by mouth once daily.      [DISCONTINUED] metFORMIN (GLUCOPHAGE) 1000 MG tablet Take 1,000 mg by mouth 2 (two) times daily with meals.      [DISCONTINUED] omeprazole (PRILOSEC) 20 MG capsule Take 20 mg by mouth once daily.      [DISCONTINUED] rosuvastatin (CRESTOR) 40 MG Tab Take 40 mg by mouth every evening.         Allergies  Review of patient's allergies indicates:  No Known Allergies    Physical Examination     Vitals:    " 06/15/22 0844   BP: 134/89   Pulse: 86   Resp: 20   Temp: 97.9 °F (36.6 °C)     Physical Exam  Vitals and nursing note reviewed.   Constitutional:       Appearance: Normal appearance. He is not ill-appearing.   HENT:      Head: Normocephalic.      Right Ear: Tympanic membrane normal.      Left Ear: Tympanic membrane normal.      Nose: Nose normal.      Mouth/Throat:      Mouth: Mucous membranes are moist.   Eyes:      Extraocular Movements: Extraocular movements intact.      Conjunctiva/sclera: Conjunctivae normal.      Pupils: Pupils are equal, round, and reactive to light.   Cardiovascular:      Rate and Rhythm: Normal rate and regular rhythm.      Pulses: Normal pulses.   Pulmonary:      Effort: Pulmonary effort is normal. No respiratory distress.      Breath sounds: Normal breath sounds.   Abdominal:      General: Bowel sounds are normal. There is no distension.      Palpations: Abdomen is soft. There is no mass.      Tenderness: There is no abdominal tenderness.      Hernia: No hernia is present.   Musculoskeletal:      Cervical back: Normal range of motion and neck supple.      Lumbar back: Tenderness present. Positive left straight leg raise test. Negative right straight leg raise test.      Right lower leg: No edema.      Left lower leg: No edema.   Skin:     General: Skin is warm and dry.      Capillary Refill: Capillary refill takes less than 2 seconds.      Findings: Rash (papular and pustular generalized rash to baseline of hair line/neck) present.   Neurological:      Mental Status: He is alert and oriented to person, place, and time. Mental status is at baseline.      Motor: No weakness.   Psychiatric:         Mood and Affect: Mood normal.         Behavior: Behavior normal.         Thought Content: Thought content normal.         Judgment: Judgment normal.           Results     Lab Results   Component Value Date    WBC 7.0 09/10/2021    RBC 5.03 09/10/2021    HGB 14.6 09/10/2021    HCT 42.3 09/10/2021     MCV 84.1 09/10/2021    MCH 29.0 09/10/2021    MCHC 34.5 09/10/2021    RDW 12.8 09/10/2021     09/10/2021    MPV 9.9 09/10/2021     Sodium Level   Date Value Ref Range Status   06/10/2022 142 136 - 145 mmol/L Final     Potassium Level   Date Value Ref Range Status   06/10/2022 3.7 3.5 - 5.1 mmol/L Final     Carbon Dioxide   Date Value Ref Range Status   06/10/2022 26 22 - 29 mmol/L Final     Blood Urea Nitrogen   Date Value Ref Range Status   06/10/2022 13.0 8.4 - 25.7 mg/dL Final     Creatinine   Date Value Ref Range Status   06/10/2022 0.76 0.73 - 1.18 mg/dL Final     Calcium Level Total   Date Value Ref Range Status   06/10/2022 9.4 8.4 - 10.2 mg/dL Final     Albumin Level   Date Value Ref Range Status   06/10/2022 4.4 3.5 - 5.0 gm/dL Final     Bilirubin Total   Date Value Ref Range Status   06/10/2022 0.7 <=1.5 mg/dL Final     Alkaline Phosphatase   Date Value Ref Range Status   06/10/2022 80 40 - 150 unit/L Final     Aspartate Aminotransferase   Date Value Ref Range Status   06/10/2022 35 (H) 5 - 34 unit/L Final     Alanine Aminotransferase   Date Value Ref Range Status   06/10/2022 49 0 - 55 unit/L Final     Estimated GFR-Non    Date Value Ref Range Status   06/10/2022 >60 mls/min/1.73/m2 Final     Lab Results   Component Value Date    CHOL 96 12/10/2021     Lab Results   Component Value Date    HDL 35 12/10/2021     No results found for: LDLCALC  Lab Results   Component Value Date    TRIG 139 12/10/2021     No results found for: CHOLHDL  Lab Results   Component Value Date    TSH 2.4486 12/10/2021     Lab Results   Component Value Date    PHUR 6.5 07/30/2021    PROTEINUA 10 (A) 07/30/2021    GLUCUA 30 (A) 07/30/2021    KETONESU Negative 07/30/2021    OCCULTUA Negative 07/30/2021    NITRITE Negative 07/30/2021    LEUKOCYTESUR Negative 07/30/2021     Lab Results   Component Value Date    HGBA1C 9.1 (H) 06/10/2022    HGBA1C 8.8 02/28/2022    HGBA1C 7.9 (H) 12/10/2021     No results found  for: MICROALBUR, [id number]   No results found for this or any previous visit.         Assessment and Plan (including Health Maintenance)     Plan:         Health Maintenance Due   Topic Date Due    COVID-19 Vaccine (1) Never done    Pneumococcal Vaccines (Age 0-64) (1 - PCV) Never done    TETANUS VACCINE  Never done    Shingles Vaccine (1 of 2) Never done       Problem List Items Addressed This Visit        Derm    Atopic dermatitis    Current Assessment & Plan     Papular and pustular rash to baseline of hair/ neck. Apply triamcinolone topical as directed.            Relevant Medications    triamcinolone acetonide 0.1% (KENALOG) 0.1 % cream       Cardiac/Vascular    Hypertension - Primary    Current Assessment & Plan     /89  Follow low sodium diet, < 2 gm/day (avoid high salty foods such as processed meats/ sausage/oneal/ sandwich meat, chips, pickles, cheese, crackers and soft drinks/ electrolyte replacement drinks).  Avoid tobacco/ alcohol use  Educated on health benefits of at least 5 days/ week of 30 minutes moderate intensity exercise (brisk walking) and 2 or more days/ week of muscle strength activities  Daily ASA 81 mg for CV prevention  Continue current medication regimen             Relevant Medications    amLODIPine (NORVASC) 10 MG tablet    lisinopriL (PRINIVIL,ZESTRIL) 2.5 MG tablet       Endocrine    Obesity    Current Assessment & Plan     BMI 33.81  Educated on increased risk of disease s/t obesity.  Educated on health benefits of at least 5 days/ week of 30 minutes moderate intensity exercise (brisk walking) and 2 or more days/ week of muscle strength activities (as tolerated).  Eat well balanced diet of fresh fruits/ vegetables, whole grains, lean meats and limit high carbohydrate foods.              Type 2 diabetes mellitus    Current Assessment & Plan     A1c worsened to 9.1%, Prior A1c: 8.8%, 7.9%, 9.2%, 11.3%  Patient has not been taking medications as prescribed. Out of Glipizide ?    Hypoglycemia: 70-80s in the mornings   Educated on ADA diet:  1. Avoid/ decrease high carbohydrate foods (rice, pasta, bread, candy, sweets, carbonated beverages)  Educated on health benefits of at least 5 days/ week of 30 minutes moderate intensity exercise (brisk walking) and 2 or more days/ week of muscle strength activities  Avoid alcohol or tobacco use if applicable  Eye exam: 12/15/21 no DR  Foot exam: 9/15/21  ACEI/ ARB- lisinopril 2.5 mg once daily   Continue ASA 81 mg once daily and statin  Continue medications as prescribed.  Decrease Basaglar insulin to 38 units due to hypoglycemia.   Questionable compliance with Glipizide as he states he was out for a while after taking BID however most recently he states he is only taking once daily due to lower blood sugars. Advised to check with pharmacy and ask for clarification regarding prescription instructions and associated diagnoses.   RTC 3 mo with labs             Relevant Medications    insulin (BASAGLAR KWIKPEN U-100 INSULIN) glargine 100 units/mL (3mL) SubQ pen    lisinopriL (PRINIVIL,ZESTRIL) 2.5 MG tablet    glipiZIDE (GLUCOTROL) 5 MG tablet    metFORMIN (GLUCOPHAGE) 1000 MG tablet    Other Relevant Orders    Hemoglobin A1C       Orthopedic    Left sided sciatica    Current Assessment & Plan     C/o left sided sciatic pain x 2 months with paresthesias/ weakness to b/l legs  Denies any recent injury/ trauma. Admits to injury while at work with [employer] in June 2017 that landed on him.   Encouraged regular exercises, warm heat/ ice therapy as needed x 15-20 minutes and Rx Meloxicam PRN           Relevant Medications    meloxicam (MOBIC) 15 MG tablet    Other Relevant Orders    X-Ray Lumbar Spine 5 View      Other Visit Diagnoses     Hyperlipidemia, unspecified hyperlipidemia type        Relevant Medications    rosuvastatin (CRESTOR) 40 MG Tab          Health Maintenance Topics with due status: Not Due       Topic Last Completion Date    Influenza Vaccine  12/13/2018    Foot Exam 09/15/2021    Diabetes Urine Screening 12/10/2021    Lipid Panel 12/10/2021    Eye Exam 04/25/2022    Hemoglobin A1c 06/10/2022    Low Dose Statin 06/15/2022    Colorectal Cancer Screening Not Due       Future Appointments   Date Time Provider Department Center   9/15/2022  8:30 AM Mariangel Coleman NP [facility]        [facility]  Health Maintenance:  CRC- FIT negative 1/11/22  PSA- 0.8 September 29, 2020  AAA- H/o tobacco abuse, quit age 27 yo.    Family History negative.    Vaccines:  Influenza- 12/13/18  Pneumonia-  Tdap-          Signature:  Mariangel Coleman NP  [facility]  [address] [facility] [address]    Date of encounter: 6/15/22

## 2022-06-15 NOTE — ASSESSMENT & PLAN NOTE
/89  Follow low sodium diet, < 2 gm/day (avoid high salty foods such as processed meats/ sausage/oneal/ sandwich meat, chips, pickles, cheese, crackers and soft drinks/ electrolyte replacement drinks).  Avoid tobacco/ alcohol use  Educated on health benefits of at least 5 days/ week of 30 minutes moderate intensity exercise (brisk walking) and 2 or more days/ week of muscle strength activities  Daily ASA 81 mg for CV prevention  Continue current medication regimen

## 2022-06-24 ENCOUNTER — TELEPHONE (OUTPATIENT)
Dept: INTERNAL MEDICINE | Facility: CLINIC | Age: 52
End: 2022-06-24

## 2022-06-24 NOTE — TELEPHONE ENCOUNTER
Please inform patient XR of low back reviewed with finding of slipped disc to L5-S1 area which is likely the cause for his pain in back and left leg. I would recommend patient engage in physical therapy, however, I see he does not have any insurance. Is patient able to afford out of pocket cost if I place referral? If not, encourage him to engage in home exercises at home that target his low back and sciatic stretches.     Please let me know patient's response.     Thanks

## 2022-07-07 NOTE — TELEPHONE ENCOUNTER
Contacted pt using , Raisa Vasques, and informed pt of results. Pt says he does not have the financial means to pay for the PT. He will call us back and let us know when he will be able to. Pt verbalizes understanding of results and recommendations.

## 2022-09-12 ENCOUNTER — LAB VISIT (OUTPATIENT)
Dept: LAB | Facility: HOSPITAL | Age: 52
End: 2022-09-12
Attending: NURSE PRACTITIONER

## 2022-09-12 DIAGNOSIS — E11.9 TYPE 2 DIABETES MELLITUS WITHOUT COMPLICATION, WITHOUT LONG-TERM CURRENT USE OF INSULIN: ICD-10-CM

## 2022-09-12 LAB
EST. AVERAGE GLUCOSE BLD GHB EST-MCNC: 211.6 MG/DL
HBA1C MFR BLD: 9 %

## 2022-09-12 PROCEDURE — 83036 HEMOGLOBIN GLYCOSYLATED A1C: CPT

## 2022-09-12 PROCEDURE — 36415 COLL VENOUS BLD VENIPUNCTURE: CPT

## 2022-09-15 ENCOUNTER — OFFICE VISIT (OUTPATIENT)
Dept: INTERNAL MEDICINE | Facility: CLINIC | Age: 52
End: 2022-09-15

## 2022-09-15 VITALS
SYSTOLIC BLOOD PRESSURE: 136 MMHG | HEIGHT: 65 IN | RESPIRATION RATE: 20 BRPM | HEART RATE: 85 BPM | BODY MASS INDEX: 33.52 KG/M2 | WEIGHT: 201.19 LBS | DIASTOLIC BLOOD PRESSURE: 72 MMHG | TEMPERATURE: 98 F

## 2022-09-15 DIAGNOSIS — I10 HYPERTENSION, UNSPECIFIED TYPE: ICD-10-CM

## 2022-09-15 DIAGNOSIS — M43.16 ANTEROLISTHESIS OF LUMBAR SPINE: ICD-10-CM

## 2022-09-15 DIAGNOSIS — E11.9 TYPE 2 DIABETES MELLITUS WITHOUT COMPLICATION, WITHOUT LONG-TERM CURRENT USE OF INSULIN: Primary | ICD-10-CM

## 2022-09-15 PROBLEM — F17.200 TOBACCO DEPENDENCY: Status: RESOLVED | Noted: 2022-06-15 | Resolved: 2022-09-15

## 2022-09-15 PROCEDURE — 99214 OFFICE O/P EST MOD 30 MIN: CPT | Mod: S$PBB,,, | Performed by: NURSE PRACTITIONER

## 2022-09-15 PROCEDURE — 99214 PR OFFICE/OUTPT VISIT, EST, LEVL IV, 30-39 MIN: ICD-10-PCS | Mod: S$PBB,,, | Performed by: NURSE PRACTITIONER

## 2022-09-15 PROCEDURE — 99215 OFFICE O/P EST HI 40 MIN: CPT | Mod: PBBFAC | Performed by: NURSE PRACTITIONER

## 2022-09-15 RX ORDER — MELOXICAM 15 MG/1
15 TABLET ORAL DAILY
Qty: 30 TABLET | Refills: 3 | Status: SHIPPED | OUTPATIENT
Start: 2022-09-15 | End: 2023-05-23 | Stop reason: SDUPTHER

## 2022-09-15 NOTE — ASSESSMENT & PLAN NOTE
XR lumbar spine Vy 15, 2022  FINDINGS:  There are 5 non-rib-bearing lumbar type vertebral bodies.  There is grade 1 anterolisthesis of L5 over S1.  The vertebral body heights are maintained.  There is mild disc height loss at L4-5 and L5-S1 with small marginal osteophytes.  There is mild facet arthropathy in the lower lumbar spine.  There are bilateral L5 pars defects.  The soft tissues are unremarkable.     Impression:     1. No acute abnormality identified.  2. Bilateral L5 pars defects with grade 1 anterolisthesis of L5.    He is unable to afford outpatient PT. Encouraged HEP- see hand outs. Warm heat /ice as needed. Rx Meloxicam prn pain, avoid other NSAIDs. If symptoms worsen, notify provider

## 2022-09-15 NOTE — ASSESSMENT & PLAN NOTE
A1c 9%, Prior A1c 9.1%  Non compliant with medications twice- three weeky   Hypoglycemia: denies  Educated on ADA diet:  1. Avoid/ decrease high carbohydrate foods (rice, pasta, bread, candy, sweets, carbonated beverages)  Educated on health benefits of at least 5 days/ week of 30 minutes moderate intensity exercise (brisk walking) and 2 or more days/ week of muscle strength activities  Avoid alcohol or tobacco use if applicable  Eye exam: 12/15/21 no DR  Foot exam: 9/15/22  ACEI/ ARB- lisinopril 2.5 mg once daily   Continue ASA 81 mg once daily and statin  Continue medications as prescribed and add Januvia 100 mg  Discussed importance of compliance with medications as prescribed.

## 2022-09-15 NOTE — PROGRESS NOTES
Mariangel L Virginia, NP   [facility]  [address] [facility] [address]      PATIENT NAME: Sinan Arreola  : 1970  DATE: 9/15/22  MRN: [id number]      Billing Provider: Mariangel Coleman NP  Level of Service: [facility] OFFICE/OUTPT VISIT, EST, LEVL IV, 30-39 MIN  Patient PCP Information       Provider PCP Type    Mariangel Coleman NP General            Reason for Visit / Chief Complaint: Follow-up (Lab results) and Diabetes       History of Present Illness / Problem Focused Workflow     Sinan Arreola presents to the clinic with Follow-up (Lab results) and Diabetes     51 yo  male for 3 mo follow up with labs. PMhx includes BCC, type 2 DM, HTN, HLD, obesity, h/o tobacco abuse. /72. A1c 9%. Reports non compliance with medications/ CBG checks about 2-3 days/wk. He states he has to get out of his quickly sometimes and forgets to  his medications and meter. He denies any hypoglycemia episodes. Reported CBGs 120-180s per pt. Continues with back and leg pain. Unable to afford outpatient PT. He is not engaging in HEP. Needs refill of Meloxicam. Denies any other concerns/ complaints today.       Review of Systems     Review of Systems   Constitutional:  Negative for appetite change, chills, fatigue, fever and unexpected weight change.   HENT:  Negative for congestion, ear pain, hearing loss, sinus pressure, sore throat and tinnitus.    Respiratory:  Negative for cough, chest tightness, shortness of breath and wheezing.    Cardiovascular:  Negative for chest pain, palpitations and leg swelling.   Gastrointestinal:  Negative for abdominal distention, abdominal pain, blood in stool, constipation, diarrhea, nausea and vomiting.   Genitourinary:  Negative for dysuria and hematuria.   Musculoskeletal:  Positive for back pain. Negative for arthralgias and myalgias.   Skin:  Negative for pallor.   Allergic/Immunologic:  "Negative for environmental allergies.   Neurological:  Negative for dizziness, syncope, weakness, numbness and headaches.   Hematological:  Negative for adenopathy. Does not bruise/bleed easily.   Psychiatric/Behavioral:  Negative for agitation, behavioral problems, confusion, hallucinations, sleep disturbance and suicidal ideas. The patient is not nervous/anxious.      Medical / Social / Family History   History reviewed. No pertinent past medical history.    Past Surgical History:   Procedure Laterality Date    CHOLECYSTECTOMY      SKIN CANCER EXCISION  2021    head       Social History  Mr. Menon  reports that he has never smoked. He has never used smokeless tobacco. He reports that he does not currently use alcohol. He reports that he does not use drugs.    Family History  Mr. Menon's Family history is unknown by patient.    Medications and Allergies     Medications  Medication List with Changes/Refills   New Medications    SITAGLIPTIN (JANUVIA) 100 MG TAB    Take 1 tablet (100 mg total) by mouth once daily.   Current Medications    AMLODIPINE (NORVASC) 10 MG TABLET    Take 1 tablet (10 mg total) by mouth once daily.    ASPIRIN (ECOTRIN) 81 MG EC TABLET    Take 81 mg by mouth.    GLIPIZIDE (GLUCOTROL) 5 MG TABLET    Take 1 tablet (5 mg total) by mouth 2 (two) times daily with meals.    INSULIN (BASAGLAR KWIKPEN U-100 INSULIN) GLARGINE 100 UNITS/ML (3ML) SUBQ PEN    Inject 38 Units into the skin every evening.    LISINOPRIL (PRINIVIL,ZESTRIL) 2.5 MG TABLET    Take 1 tablet (2.5 mg total) by mouth once daily.    METFORMIN (GLUCOPHAGE) 1000 MG TABLET    Take 1 tablet (1,000 mg total) by mouth 2 (two) times daily with meals.    OMEPRAZOLE (PRILOSEC) 20 MG CAPSULE    Take 1 capsule (20 mg total) by mouth once daily.    ROSUVASTATIN (CRESTOR) 40 MG TAB    Take 1 tablet (40 mg total) by mouth every evening.    TECHLITE PEN NEEDLE 32 GAUGE X 5/32" NDLE    USE DAILY FOR INSULIN ADMINISTRATION    TRIAMCINOLONE ACETONIDE " 0.1% (KENALOG) 0.1 % CREAM    Apply topically 2 (two) times daily.   Changed and/or Refilled Medications    Modified Medication Previous Medication    MELOXICAM (MOBIC) 15 MG TABLET meloxicam (MOBIC) 15 MG tablet       Take 1 tablet (15 mg total) by mouth once daily. Take with food/ avoid other NSAIDs    Take 1 tablet (15 mg total) by mouth once daily. Take with food/ avoid other NSAIDs           Allergies  Review of patient's allergies indicates:  No Known Allergies    Physical Examination     Vitals:    09/15/22 0852   BP: 136/72   Pulse: 85   Resp: 20   Temp: 98.3 °F (36.8 °C)     Physical Exam  Constitutional:       General: He is not in acute distress.     Appearance: Normal appearance. He is not ill-appearing.   HENT:      Head: Normocephalic.   Neck:      Vascular: No carotid bruit.   Cardiovascular:      Rate and Rhythm: Normal rate and regular rhythm.      Pulses: Normal pulses.           Dorsalis pedis pulses are 2+ on the right side and 2+ on the left side.        Posterior tibial pulses are 2+ on the right side and 2+ on the left side.      Heart sounds: Normal heart sounds. No murmur heard.  Pulmonary:      Effort: Pulmonary effort is normal. No respiratory distress.      Breath sounds: Normal breath sounds.   Musculoskeletal:         General: Normal range of motion.      Cervical back: Normal range of motion and neck supple. No tenderness.      Right lower leg: No edema.      Left lower leg: No edema.      Right foot: No deformity or bunion.      Left foot: No deformity or bunion.   Feet:      Right foot:      Protective Sensation: 5 sites tested.  5 sites sensed.      Skin integrity: Skin integrity normal. No ulcer, blister, skin breakdown, erythema, warmth, callus, dry skin or fissure.      Left foot:      Protective Sensation: 5 sites tested.  5 sites sensed.      Skin integrity: Skin integrity normal. No ulcer, blister, skin breakdown, erythema, warmth, callus, dry skin or fissure.    Lymphadenopathy:      Cervical: No cervical adenopathy.   Skin:     General: Skin is warm and dry.      Capillary Refill: Capillary refill takes less than 2 seconds.   Neurological:      Mental Status: He is alert and oriented to person, place, and time.      Motor: No weakness.      Coordination: Coordination normal.      Gait: Gait normal.   Psychiatric:         Mood and Affect: Mood normal.         Behavior: Behavior normal.         Thought Content: Thought content normal.         Judgment: Judgment normal.         Results     Lab Results   Component Value Date    WBC 7.0 09/10/2021    RBC 5.03 09/10/2021    HGB 14.6 09/10/2021    HCT 42.3 09/10/2021    MCV 84.1 09/10/2021    MCH 29.0 09/10/2021    MCHC 34.5 09/10/2021    RDW 12.8 09/10/2021     09/10/2021    MPV 9.9 09/10/2021     Sodium Level   Date Value Ref Range Status   06/10/2022 142 136 - 145 mmol/L Final     Potassium Level   Date Value Ref Range Status   06/10/2022 3.7 3.5 - 5.1 mmol/L Final     Carbon Dioxide   Date Value Ref Range Status   06/10/2022 26 22 - 29 mmol/L Final     Blood Urea Nitrogen   Date Value Ref Range Status   06/10/2022 13.0 8.4 - 25.7 mg/dL Final     Creatinine   Date Value Ref Range Status   06/10/2022 0.76 0.73 - 1.18 mg/dL Final     Calcium Level Total   Date Value Ref Range Status   06/10/2022 9.4 8.4 - 10.2 mg/dL Final     Albumin Level   Date Value Ref Range Status   06/10/2022 4.4 3.5 - 5.0 gm/dL Final     Bilirubin Total   Date Value Ref Range Status   06/10/2022 0.7 <=1.5 mg/dL Final     Alkaline Phosphatase   Date Value Ref Range Status   06/10/2022 80 40 - 150 unit/L Final     Aspartate Aminotransferase   Date Value Ref Range Status   06/10/2022 35 (H) 5 - 34 unit/L Final     Alanine Aminotransferase   Date Value Ref Range Status   06/10/2022 49 0 - 55 unit/L Final     Estimated GFR-Non    Date Value Ref Range Status   06/10/2022 >60 mls/min/1.73/m2 Final     Lab Results   Component Value Date     CHOL 96 12/10/2021     Lab Results   Component Value Date    HDL 35 12/10/2021     No results found for: LDLCALC  Lab Results   Component Value Date    TRIG 139 12/10/2021     No results found for: CHOLHDL  Lab Results   Component Value Date    TSH 2.4486 12/10/2021     Lab Results   Component Value Date    PHUR 6.5 07/30/2021    PROTEINUA 10 (A) 07/30/2021    GLUCUA 30 (A) 07/30/2021    KETONESU Negative 07/30/2021    OCCULTUA Negative 07/30/2021    NITRITE Negative 07/30/2021    LEUKOCYTESUR Negative 07/30/2021     Lab Results   Component Value Date    HGBA1C 9.0 (H) 09/12/2022    HGBA1C 9.1 (H) 06/10/2022    HGBA1C 8.8 02/28/2022     No results found for: MICROALBUR, [id number]   No results found for this or any previous visit.         Assessment and Plan (including Health Maintenance)     Plan:         Health Maintenance Due   Topic Date Due    COVID-19 Vaccine (1) Never done    Pneumococcal Vaccines (Age 0-64) (1 - PCV) Never done    Eye Exam  Never done    TETANUS VACCINE  Never done    Shingles Vaccine (1 of 2) Never done    Influenza Vaccine (1) 09/01/2022       Problem List Items Addressed This Visit          Cardiac/Vascular    Hypertension    Current Assessment & Plan     /72  Follow low sodium diet, < 2 gm/day (avoid high salty foods such as processed meats/ sausage/oneal/ sandwich meat, chips, pickles, cheese, crackers and soft drinks/ electrolyte replacement drinks).  Avoid tobacco/ alcohol use  Educated on health benefits of at least 5 days/ week of 30 minutes moderate intensity exercise (brisk walking) and 2 or more days/ week of muscle strength activities  Daily ASA 81 mg for CV prevention  Continue current medication regimen              Endocrine    Type 2 diabetes mellitus - Primary    Current Assessment & Plan     A1c 9%, Prior A1c 9.1%  Non compliant with medications twice- three weeky   Hypoglycemia: denies  Educated on ADA diet:  1. Avoid/ decrease high carbohydrate foods (rice,  pasta, bread, candy, sweets, carbonated beverages)  Educated on health benefits of at least 5 days/ week of 30 minutes moderate intensity exercise (brisk walking) and 2 or more days/ week of muscle strength activities  Avoid alcohol or tobacco use if applicable  Eye exam: 12/15/21 no DR  Foot exam: 9/15/22  ACEI/ ARB- lisinopril 2.5 mg once daily   Continue ASA 81 mg once daily and statin  Continue medications as prescribed and add Januvia 100 mg  Discussed importance of compliance with medications as prescribed.              Relevant Medications    SITagliptin (JANUVIA) 100 MG Tab    Other Relevant Orders    Comprehensive Metabolic Panel    CBC Auto Differential    Hemoglobin A1C    Lipid Panel    TSH    Microalbumin/Creatinine Ratio, Urine       Orthopedic    Anterolisthesis of lumbar spine    Current Assessment & Plan     XR lumbar spine Vy 15, 2022  FINDINGS:  There are 5 non-rib-bearing lumbar type vertebral bodies.  There is grade 1 anterolisthesis of L5 over S1.  The vertebral body heights are maintained.  There is mild disc height loss at L4-5 and L5-S1 with small marginal osteophytes.  There is mild facet arthropathy in the lower lumbar spine.  There are bilateral L5 pars defects.  The soft tissues are unremarkable.     Impression:     1. No acute abnormality identified.  2. Bilateral L5 pars defects with grade 1 anterolisthesis of L5.    He is unable to afford outpatient PT. Encouraged HEP- see hand outs. Warm heat /ice as needed. Rx Meloxicam prn pain, avoid other NSAIDs. If symptoms worsen, notify provider          Relevant Medications    meloxicam (MOBIC) 15 MG tablet       Health Maintenance Topics with due status: Not Due       Topic Last Completion Date    Diabetes Urine Screening 12/10/2021    Lipid Panel 12/10/2021    Hemoglobin A1c 09/12/2022    Low Dose Statin 09/15/2022    Foot Exam 09/15/2022    Colorectal Cancer Screening Not Due       Future Appointments   Date Time Provider Department  [place]   12/15/2022  7:00 AM Mariangel Coleman NP [facility]      Follow up in 3 months with labs.      Signature:  Mariangel Coleman NP  [facility]  [address] [address] [facility] [address]    Date of encounter: 9/15/22

## 2022-09-15 NOTE — ASSESSMENT & PLAN NOTE
/72  Follow low sodium diet, < 2 gm/day (avoid high salty foods such as processed meats/ sausage/oneal/ sandwich meat, chips, pickles, cheese, crackers and soft drinks/ electrolyte replacement drinks).  Avoid tobacco/ alcohol use  Educated on health benefits of at least 5 days/ week of 30 minutes moderate intensity exercise (brisk walking) and 2 or more days/ week of muscle strength activities  Daily ASA 81 mg for CV prevention  Continue current medication regimen

## 2022-09-28 ENCOUNTER — DOCUMENTATION ONLY (OUTPATIENT)
Dept: ADMINISTRATIVE | Facility: HOSPITAL | Age: 52
End: 2022-09-28

## 2022-12-12 ENCOUNTER — LAB VISIT (OUTPATIENT)
Dept: LAB | Facility: HOSPITAL | Age: 52
End: 2022-12-12
Attending: NURSE PRACTITIONER

## 2022-12-12 DIAGNOSIS — E11.9 TYPE 2 DIABETES MELLITUS WITHOUT COMPLICATION, WITHOUT LONG-TERM CURRENT USE OF INSULIN: ICD-10-CM

## 2022-12-12 LAB
ALBUMIN SERPL-MCNC: 4.3 GM/DL (ref 3.5–5)
ALBUMIN/GLOB SERPL: 1.3 RATIO (ref 1.1–2)
ALP SERPL-CCNC: 78 UNIT/L (ref 40–150)
ALT SERPL-CCNC: 41 UNIT/L (ref 0–55)
AST SERPL-CCNC: 19 UNIT/L (ref 5–34)
BASOPHILS # BLD AUTO: 0.02 X10(3)/MCL (ref 0–0.2)
BASOPHILS NFR BLD AUTO: 0.2 %
BILIRUBIN DIRECT+TOT PNL SERPL-MCNC: 0.5 MG/DL
BUN SERPL-MCNC: 13.9 MG/DL (ref 8.4–25.7)
CALCIUM SERPL-MCNC: 9.3 MG/DL (ref 8.4–10.2)
CHLORIDE SERPL-SCNC: 108 MMOL/L (ref 98–107)
CHOLEST SERPL-MCNC: 91 MG/DL
CHOLEST/HDLC SERPL: 3 {RATIO} (ref 0–5)
CO2 SERPL-SCNC: 23 MMOL/L (ref 22–29)
CREAT SERPL-MCNC: 0.92 MG/DL (ref 0.73–1.18)
EOSINOPHIL # BLD AUTO: 0.12 X10(3)/MCL (ref 0–0.9)
EOSINOPHIL NFR BLD AUTO: 1.3 %
ERYTHROCYTE [DISTWIDTH] IN BLOOD BY AUTOMATED COUNT: 12.9 % (ref 11.5–17)
EST. AVERAGE GLUCOSE BLD GHB EST-MCNC: 180 MG/DL
GFR SERPLBLD CREATININE-BSD FMLA CKD-EPI: >60 MLS/MIN/1.73/M2
GLOBULIN SER-MCNC: 3.3 GM/DL (ref 2.4–3.5)
GLUCOSE SERPL-MCNC: 166 MG/DL (ref 74–100)
HBA1C MFR BLD: 7.9 %
HCT VFR BLD AUTO: 42.5 % (ref 42–52)
HDLC SERPL-MCNC: 33 MG/DL (ref 35–60)
HGB BLD-MCNC: 14.3 GM/DL (ref 14–18)
IMM GRANULOCYTES # BLD AUTO: 0.01 X10(3)/MCL (ref 0–0.04)
IMM GRANULOCYTES NFR BLD AUTO: 0.1 %
LDLC SERPL CALC-MCNC: 37 MG/DL (ref 50–140)
LYMPHOCYTES # BLD AUTO: 2.38 X10(3)/MCL (ref 0.6–4.6)
LYMPHOCYTES NFR BLD AUTO: 26.2 %
MCH RBC QN AUTO: 28.7 PG (ref 27–31)
MCHC RBC AUTO-ENTMCNC: 33.6 MG/DL (ref 33–36)
MCV RBC AUTO: 85.3 FL (ref 80–94)
MONOCYTES # BLD AUTO: 0.71 X10(3)/MCL (ref 0.1–1.3)
MONOCYTES NFR BLD AUTO: 7.8 %
NEUTROPHILS # BLD AUTO: 5.9 X10(3)/MCL (ref 2.1–9.2)
NEUTROPHILS NFR BLD AUTO: 64.4 %
NRBC BLD AUTO-RTO: 0 %
PLATELET # BLD AUTO: 211 X10(3)/MCL (ref 130–400)
PMV BLD AUTO: 10.5 FL (ref 7.4–10.4)
POTASSIUM SERPL-SCNC: 3.9 MMOL/L (ref 3.5–5.1)
PROT SERPL-MCNC: 7.6 GM/DL (ref 6.4–8.3)
RBC # BLD AUTO: 4.98 X10(6)/MCL (ref 4.7–6.1)
SODIUM SERPL-SCNC: 139 MMOL/L (ref 136–145)
TRIGL SERPL-MCNC: 105 MG/DL (ref 34–140)
TSH SERPL-ACNC: 1.77 UIU/ML (ref 0.35–4.94)
VLDLC SERPL CALC-MCNC: 21 MG/DL
WBC # SPEC AUTO: 9.1 X10(3)/MCL (ref 4.5–11.5)

## 2022-12-12 PROCEDURE — 83036 HEMOGLOBIN GLYCOSYLATED A1C: CPT

## 2022-12-12 PROCEDURE — 84443 ASSAY THYROID STIM HORMONE: CPT

## 2022-12-12 PROCEDURE — 36415 COLL VENOUS BLD VENIPUNCTURE: CPT

## 2022-12-12 PROCEDURE — 80053 COMPREHEN METABOLIC PANEL: CPT

## 2022-12-12 PROCEDURE — 80061 LIPID PANEL: CPT

## 2022-12-12 PROCEDURE — 85025 COMPLETE CBC W/AUTO DIFF WBC: CPT

## 2022-12-15 ENCOUNTER — TELEPHONE (OUTPATIENT)
Dept: INTERNAL MEDICINE | Facility: CLINIC | Age: 52
End: 2022-12-15

## 2022-12-15 ENCOUNTER — OFFICE VISIT (OUTPATIENT)
Dept: INTERNAL MEDICINE | Facility: CLINIC | Age: 52
End: 2022-12-15

## 2022-12-15 ENCOUNTER — CLINICAL SUPPORT (OUTPATIENT)
Dept: INTERNAL MEDICINE | Facility: CLINIC | Age: 52
End: 2022-12-15
Attending: NURSE PRACTITIONER

## 2022-12-15 VITALS
TEMPERATURE: 98 F | SYSTOLIC BLOOD PRESSURE: 121 MMHG | HEART RATE: 89 BPM | HEIGHT: 65 IN | DIASTOLIC BLOOD PRESSURE: 82 MMHG | BODY MASS INDEX: 33.72 KG/M2 | WEIGHT: 202.38 LBS | RESPIRATION RATE: 16 BRPM

## 2022-12-15 DIAGNOSIS — E11.9 TYPE 2 DIABETES MELLITUS WITHOUT COMPLICATION, WITHOUT LONG-TERM CURRENT USE OF INSULIN: Primary | ICD-10-CM

## 2022-12-15 DIAGNOSIS — Z23 FLU VACCINE NEED: ICD-10-CM

## 2022-12-15 DIAGNOSIS — F43.21 GRIEVING: ICD-10-CM

## 2022-12-15 DIAGNOSIS — E66.9 OBESITY, UNSPECIFIED CLASSIFICATION, UNSPECIFIED OBESITY TYPE, UNSPECIFIED WHETHER SERIOUS COMORBIDITY PRESENT: ICD-10-CM

## 2022-12-15 DIAGNOSIS — E11.9 TYPE 2 DIABETES MELLITUS WITHOUT COMPLICATION, WITHOUT LONG-TERM CURRENT USE OF INSULIN: ICD-10-CM

## 2022-12-15 DIAGNOSIS — I10 HYPERTENSION, UNSPECIFIED TYPE: ICD-10-CM

## 2022-12-15 DIAGNOSIS — E78.5 HYPERLIPIDEMIA, UNSPECIFIED HYPERLIPIDEMIA TYPE: ICD-10-CM

## 2022-12-15 PROCEDURE — 92228 IMG RTA DETC/MNTR DS PHY/QHP: CPT | Mod: PBBFAC

## 2022-12-15 PROCEDURE — 99214 PR OFFICE/OUTPT VISIT, EST, LEVL IV, 30-39 MIN: ICD-10-PCS | Mod: S$PBB,,, | Performed by: NURSE PRACTITIONER

## 2022-12-15 PROCEDURE — 99214 OFFICE O/P EST MOD 30 MIN: CPT | Mod: PBBFAC | Performed by: NURSE PRACTITIONER

## 2022-12-15 PROCEDURE — 99214 OFFICE O/P EST MOD 30 MIN: CPT | Mod: S$PBB,,, | Performed by: NURSE PRACTITIONER

## 2022-12-15 RX ORDER — ASPIRIN 81 MG/1
81 TABLET ORAL DAILY
Qty: 90 TABLET | Refills: 2 | Status: SHIPPED | OUTPATIENT
Start: 2022-12-15 | End: 2024-01-09 | Stop reason: SDUPTHER

## 2022-12-15 RX ORDER — OMEPRAZOLE 20 MG/1
20 CAPSULE, DELAYED RELEASE ORAL DAILY
Qty: 90 CAPSULE | Refills: 2 | Status: SHIPPED | OUTPATIENT
Start: 2022-12-15 | End: 2023-05-23 | Stop reason: SDUPTHER

## 2022-12-15 RX ORDER — GLIPIZIDE 5 MG/1
5 TABLET ORAL 2 TIMES DAILY WITH MEALS
Qty: 180 TABLET | Refills: 2 | Status: SHIPPED | OUTPATIENT
Start: 2022-12-15 | End: 2023-05-23 | Stop reason: SDUPTHER

## 2022-12-15 RX ORDER — AMLODIPINE BESYLATE 10 MG/1
10 TABLET ORAL DAILY
Qty: 90 TABLET | Refills: 2 | Status: SHIPPED | OUTPATIENT
Start: 2022-12-15 | End: 2023-05-23 | Stop reason: SDUPTHER

## 2022-12-15 RX ORDER — INSULIN GLARGINE 100 [IU]/ML
40 INJECTION, SOLUTION SUBCUTANEOUS NIGHTLY
Qty: 15 ML | Refills: 6 | Status: SHIPPED | OUTPATIENT
Start: 2022-12-15 | End: 2023-05-23 | Stop reason: SDUPTHER

## 2022-12-15 RX ORDER — ROSUVASTATIN CALCIUM 40 MG/1
40 TABLET, COATED ORAL NIGHTLY
Qty: 90 TABLET | Refills: 2 | Status: SHIPPED | OUTPATIENT
Start: 2022-12-15 | End: 2023-05-23 | Stop reason: SDUPTHER

## 2022-12-15 RX ORDER — METFORMIN HYDROCHLORIDE 1000 MG/1
1000 TABLET ORAL 2 TIMES DAILY WITH MEALS
Qty: 180 TABLET | Refills: 2 | Status: SHIPPED | OUTPATIENT
Start: 2022-12-15 | End: 2023-05-23 | Stop reason: SDUPTHER

## 2022-12-15 RX ORDER — LISINOPRIL 2.5 MG/1
2.5 TABLET ORAL DAILY
Qty: 90 TABLET | Refills: 2 | Status: SHIPPED | OUTPATIENT
Start: 2022-12-15 | End: 2023-05-23 | Stop reason: SDUPTHER

## 2022-12-15 RX ORDER — PEN NEEDLE, DIABETIC 32GX 5/32"
NEEDLE, DISPOSABLE MISCELLANEOUS
Qty: 100 EACH | Refills: 3 | Status: SHIPPED | OUTPATIENT
Start: 2022-12-15 | End: 2024-01-09 | Stop reason: SDUPTHER

## 2022-12-15 NOTE — ASSESSMENT & PLAN NOTE
BMI 33.68  Educated on increased risk of disease s/t obesity.  Educated on health benefits of at least 5 days/ week of 30 minutes moderate intensity exercise (brisk walking) and 2 or more days/ week of muscle strength activities (as tolerated).  Eat well balanced diet of fresh fruits/ vegetables, whole grains, lean meats and limit high carbohydrate foods.

## 2022-12-15 NOTE — ASSESSMENT & PLAN NOTE
A1c improved to 7.9%; Prior A1c 9%  CBGs: average 110s  Hypoglycemia: denies  Educated on ADA diet:  1. Avoid/ decrease high carbohydrate foods (rice, pasta, bread, candy, sweets, carbonated beverages)  Educated on health benefits of at least 5 days/ week of 30 minutes moderate intensity exercise (brisk walking) and 2 or more days/ week of muscle strength activities  Avoid alcohol or tobacco use if applicable  Eye exam: 12/15/21 no DR. Fundus photos ordered today 12/15/22  Foot exam: 9/15/22  ACEI/ ARB- lisinopril 2.5 mg once daily   Continue ASA 81 mg once daily and statin  Increase Basaglar to 40 units SC q HS

## 2022-12-15 NOTE — ASSESSMENT & PLAN NOTE
Bp 121/82  Follow low sodium diet, < 2 gm/day (avoid high salty foods such as processed meats/ sausage/oneal/ sandwich meat, chips, pickles, cheese, crackers and soft drinks/ electrolyte replacement drinks).  Avoid tobacco/ alcohol use  Educated on health benefits of at least 5 days/ week of 30 minutes moderate intensity exercise (brisk walking) and 2 or more days/ week of muscle strength activities  Daily ASA 81 mg for CV prevention  Continue current medication regimen

## 2022-12-15 NOTE — ASSESSMENT & PLAN NOTE
Pt notified of normal pap smear   Sadly he reports he lost his 16 year old son about a month ago. He states overall he is doing well and has family/ social support. Eating and sleeping well. Denies SI/HI or requests medication. Advised if any depressive symptoms, SI/HI- call 911, report to nearest ER and notify provider

## 2022-12-15 NOTE — PROGRESS NOTES
Mariangel L Virginia, NP   [facility]  [address] [facility] [address]      PATIENT NAME: Sinan Arreola  : 1970  DATE: 12/15/22  MRN: [id number]      Billing Provider: Mariangel Coleman NP  Level of Service: SC OFFICE/OUTPT VISIT, EST, LEVL IV, 30-39 MIN  Patient PCP Information       Provider PCP Type    Mariangel Coleman NP General            Reason for Visit / Chief Complaint: Follow-up (results)       History of Present Illness / Problem Focused Workflow     Sinan Arreola presents to the clinic with Follow-up (results)     53 yo  male for 3 month follow up with lab results. PMhx includes BCC, type 2 DM, HTN, HLD, obesity, h/o tobacco abuse. /82. Labs reviewed with A1c improved to 7.9%. CBGs average around 110s. Sadly he reports he lost his 16 year old son about a month ago. He states overall he is doing well. He states his appetite and sleep are good. He denies any SI/HI or request of medication. He denies any fever/ chills, cough, SOB, CP.           Review of Systems     Review of Systems   Constitutional:  Negative for appetite change, chills, fatigue, fever and unexpected weight change.   HENT:  Negative for congestion, ear pain, hearing loss, sinus pressure, sore throat and tinnitus.    Respiratory:  Negative for cough, chest tightness, shortness of breath and wheezing.    Cardiovascular:  Negative for chest pain, palpitations and leg swelling.   Gastrointestinal:  Negative for abdominal distention, abdominal pain, blood in stool, constipation, diarrhea, nausea and vomiting.   Genitourinary:  Negative for dysuria and hematuria.   Musculoskeletal:  Negative for arthralgias and myalgias.   Skin:  Negative for pallor.   Allergic/Immunologic: Negative for environmental allergies.   Neurological:  Negative for dizziness, syncope, weakness, numbness and headaches.   Hematological:  Negative for  adenopathy. Does not bruise/bleed easily.   Psychiatric/Behavioral:  Negative for agitation, behavioral problems, confusion, hallucinations, sleep disturbance and suicidal ideas. The patient is not nervous/anxious.      Medical / Social / Family History   History reviewed. No pertinent past medical history.    Past Surgical History:   Procedure Laterality Date    CHOLECYSTECTOMY      SKIN CANCER EXCISION  2021    head       Social History  Mr. Menon  reports that he has never smoked. He has never used smokeless tobacco. He reports that he does not currently use alcohol. He reports that he does not use drugs.    Family History  Mr. Menon's Family history is unknown by patient.    Medications and Allergies     Medications  Medication List with Changes/Refills   Current Medications    MELOXICAM (MOBIC) 15 MG TABLET    Take 1 tablet (15 mg total) by mouth once daily. Take with food/ avoid other NSAIDs    TRIAMCINOLONE ACETONIDE 0.1% (KENALOG) 0.1 % CREAM    Apply topically 2 (two) times daily.   Changed and/or Refilled Medications    Modified Medication Previous Medication    AMLODIPINE (NORVASC) 10 MG TABLET amLODIPine (NORVASC) 10 MG tablet       Take 1 tablet (10 mg total) by mouth once daily.    Take 1 tablet (10 mg total) by mouth once daily.    ASPIRIN (ECOTRIN) 81 MG EC TABLET aspirin (ECOTRIN) 81 MG EC tablet       Take 1 tablet (81 mg total) by mouth once daily.    Take 81 mg by mouth.    GLIPIZIDE (GLUCOTROL) 5 MG TABLET glipiZIDE (GLUCOTROL) 5 MG tablet       Take 1 tablet (5 mg total) by mouth 2 (two) times daily with meals.    Take 1 tablet (5 mg total) by mouth 2 (two) times daily with meals.    INSULIN (BASAGLAR KWIKPEN U-100 INSULIN) GLARGINE 100 UNITS/ML SUBQ PEN insulin (BASAGLAR KWIKPEN U-100 INSULIN) glargine 100 units/mL (3mL) SubQ pen       Inject 40 Units into the skin every evening.    Inject 38 Units into the skin every evening.    LISINOPRIL (PRINIVIL,ZESTRIL) 2.5 MG TABLET lisinopriL  "(PRINIVIL,ZESTRIL) 2.5 MG tablet       Take 1 tablet (2.5 mg total) by mouth once daily.    Take 1 tablet (2.5 mg total) by mouth once daily.    METFORMIN (GLUCOPHAGE) 1000 MG TABLET metFORMIN (GLUCOPHAGE) 1000 MG tablet       Take 1 tablet (1,000 mg total) by mouth 2 (two) times daily with meals.    Take 1 tablet (1,000 mg total) by mouth 2 (two) times daily with meals.    OMEPRAZOLE (PRILOSEC) 20 MG CAPSULE omeprazole (PRILOSEC) 20 MG capsule       Take 1 capsule (20 mg total) by mouth once daily.    Take 1 capsule (20 mg total) by mouth once daily.    ROSUVASTATIN (CRESTOR) 40 MG TAB rosuvastatin (CRESTOR) 40 MG Tab       Take 1 tablet (40 mg total) by mouth every evening.    Take 1 tablet (40 mg total) by mouth every evening.    SITAGLIPTIN PHOSPHATE (JANUVIA) 100 MG TAB SITagliptin (JANUVIA) 100 MG Tab       Take 1 tablet (100 mg total) by mouth once daily.    Take 1 tablet (100 mg total) by mouth once daily.    TECHLITE PEN NEEDLE 32 GAUGE X 5/32" NDLE TECHLITE PEN NEEDLE 32 gauge x 5/32" Ndle       USE DAILY FOR INSULIN ADMINISTRATION    USE DAILY FOR INSULIN ADMINISTRATION           Allergies  Review of patient's allergies indicates:  No Known Allergies    Physical Examination     Vitals:    12/15/22 0859   BP: 121/82   Pulse: 89   Resp: 16   Temp: 98.1 °F (36.7 °C)     Physical Exam  Vitals and nursing note reviewed.   Constitutional:       Appearance: Normal appearance. He is not ill-appearing.   HENT:      Head: Normocephalic.   Neck:      Vascular: No carotid bruit.   Cardiovascular:      Rate and Rhythm: Normal rate and regular rhythm.      Pulses: Normal pulses.   Pulmonary:      Effort: Pulmonary effort is normal. No respiratory distress.      Breath sounds: Normal breath sounds.   Musculoskeletal:         General: Normal range of motion.      Cervical back: Normal range of motion and neck supple. No tenderness.      Right lower leg: No edema.      Left lower leg: No edema.   Lymphadenopathy:      " Cervical: No cervical adenopathy.   Skin:     General: Skin is warm and dry.      Capillary Refill: Capillary refill takes less than 2 seconds.   Neurological:      Mental Status: He is alert and oriented to person, place, and time. Mental status is at baseline.      Motor: No weakness.   Psychiatric:         Mood and Affect: Mood normal.         Behavior: Behavior normal.         Thought Content: Thought content normal.         Judgment: Judgment normal.         Results     Lab Results   Component Value Date    WBC 9.1 12/12/2022    RBC 4.98 12/12/2022    HGB 14.3 12/12/2022    HCT 42.5 12/12/2022    MCV 85.3 12/12/2022    MCH 28.7 12/12/2022    MCHC 33.6 12/12/2022    RDW 12.9 12/12/2022     12/12/2022    MPV 10.5 (H) 12/12/2022     Sodium Level   Date Value Ref Range Status   12/12/2022 139 136 - 145 mmol/L Final     Potassium Level   Date Value Ref Range Status   12/12/2022 3.9 3.5 - 5.1 mmol/L Final     Carbon Dioxide   Date Value Ref Range Status   12/12/2022 23 22 - 29 mmol/L Final     Blood Urea Nitrogen   Date Value Ref Range Status   12/12/2022 13.9 8.4 - 25.7 mg/dL Final     Creatinine   Date Value Ref Range Status   12/12/2022 0.92 0.73 - 1.18 mg/dL Final     Calcium Level Total   Date Value Ref Range Status   12/12/2022 9.3 8.4 - 10.2 mg/dL Final     Albumin Level   Date Value Ref Range Status   12/12/2022 4.3 3.5 - 5.0 gm/dL Final     Bilirubin Total   Date Value Ref Range Status   12/12/2022 0.5 <=1.5 mg/dL Final     Alkaline Phosphatase   Date Value Ref Range Status   12/12/2022 78 40 - 150 unit/L Final     Aspartate Aminotransferase   Date Value Ref Range Status   12/12/2022 19 5 - 34 unit/L Final     Alanine Aminotransferase   Date Value Ref Range Status   12/12/2022 41 0 - 55 unit/L Final     Estimated GFR-Non    Date Value Ref Range Status   06/10/2022 >60 mls/min/1.73/m2 Final     Lab Results   Component Value Date    CHOL 91 12/12/2022     Lab Results   Component Value  Date    HDL 33 (L) 12/12/2022     No results found for: LDLCALC  Lab Results   Component Value Date    TRIG 105 12/12/2022     No results found for: CHOLHDL  Lab Results   Component Value Date    TSH 1.7686 12/12/2022     Lab Results   Component Value Date    PHUR 6.5 07/30/2021    PROTEINUA 10 (A) 07/30/2021    GLUCUA 30 (A) 07/30/2021    KETONESU Negative 07/30/2021    OCCULTUA Negative 07/30/2021    NITRITE Negative 07/30/2021    LEUKOCYTESUR Negative 07/30/2021     Lab Results   Component Value Date    HGBA1C 7.9 (H) 12/12/2022    HGBA1C 9.0 (H) 09/12/2022    HGBA1C 9.1 (H) 06/10/2022     No results found for: MICROALBUR, [id number]   No results found for this or any previous visit.         Assessment and Plan (including Health Maintenance)     Plan:         Health Maintenance Due   Topic Date Due    COVID-19 Vaccine (1) Never done    Diabetes Urine Screening  12/10/2022       Problem List Items Addressed This Visit          Psychiatric    Grieving    Current Assessment & Plan     Sadly he reports he lost his 16 year old son about a month ago. He states overall he is doing well and has family/ social support. Eating and sleeping well. Denies SI/HI or requests medication. Advised if any depressive symptoms, SI/HI- call 911, report to nearest ER and notify provider            Cardiac/Vascular    Hypertension    Current Assessment & Plan     Bp 121/82  Follow low sodium diet, < 2 gm/day (avoid high salty foods such as processed meats/ sausage/oneal/ sandwich meat, chips, pickles, cheese, crackers and soft drinks/ electrolyte replacement drinks).  Avoid tobacco/ alcohol use  Educated on health benefits of at least 5 days/ week of 30 minutes moderate intensity exercise (brisk walking) and 2 or more days/ week of muscle strength activities  Daily ASA 81 mg for CV prevention  Continue current medication regimen           Relevant Medications    amLODIPine (NORVASC) 10 MG tablet    aspirin (ECOTRIN) 81 MG EC tablet     "lisinopriL (PRINIVIL,ZESTRIL) 2.5 MG tablet       Endocrine    Obesity    Current Assessment & Plan     BMI 33.68  Educated on increased risk of disease s/t obesity.  Educated on health benefits of at least 5 days/ week of 30 minutes moderate intensity exercise (brisk walking) and 2 or more days/ week of muscle strength activities (as tolerated).  Eat well balanced diet of fresh fruits/ vegetables, whole grains, lean meats and limit high carbohydrate foods.            Type 2 diabetes mellitus - Primary    Current Assessment & Plan     A1c improved to 7.9%; Prior A1c 9%  CBGs: average 110s  Hypoglycemia: denies  Educated on ADA diet:  1. Avoid/ decrease high carbohydrate foods (rice, pasta, bread, candy, sweets, carbonated beverages)  Educated on health benefits of at least 5 days/ week of 30 minutes moderate intensity exercise (brisk walking) and 2 or more days/ week of muscle strength activities  Avoid alcohol or tobacco use if applicable  Eye exam: 12/15/21 no DR. Fundus photos ordered today 12/15/22  Foot exam: 9/15/22  ACEI/ ARB- lisinopril 2.5 mg once daily   Continue ASA 81 mg once daily and statin  Increase Basaglar to 40 units SC q HS         Relevant Medications    aspirin (ECOTRIN) 81 MG EC tablet    glipiZIDE (GLUCOTROL) 5 MG tablet    lisinopriL (PRINIVIL,ZESTRIL) 2.5 MG tablet    metFORMIN (GLUCOPHAGE) 1000 MG tablet    rosuvastatin (CRESTOR) 40 MG Tab    SITagliptin phosphate (JANUVIA) 100 MG Tab    TECHLITE PEN NEEDLE 32 gauge x 5/32" Ndle    insulin (BASAGLAR KWIKPEN U-100 INSULIN) glargine 100 units/mL SubQ pen    Other Relevant Orders    Diabetic Eye Screening Photo    Hemoglobin A1C    Microalbumin/Creatinine Ratio, Urine     Other Visit Diagnoses       Hyperlipidemia, unspecified hyperlipidemia type        Relevant Medications    aspirin (ECOTRIN) 81 MG EC tablet    rosuvastatin (CRESTOR) 40 MG Tab    Flu vaccine need      Patient interested and will obtain at outside pharmacy for more affordable " cost.             Health Maintenance Topics with due status: Not Due       Topic Last Completion Date    Colorectal Cancer Screening 01/11/2022    Foot Exam 09/15/2022    Lipid Panel 12/12/2022    Hemoglobin A1c 12/12/2022    Low Dose Statin 12/15/2022    Eye Exam 12/15/2022       Future Appointments   Date Time Provider Department Center   12/15/2022  9:30 AM DIABETIC EYE [facility], [facility]   5/23/2023  7:30 AM Mariangel Coleman [facility]      Follow up in 3 months with labs.       Signature:  Mariangel Coleman NP  [facility]  [address] [facility] [id number]    Date of encounter: 12/15/22

## 2022-12-15 NOTE — PROGRESS NOTES
Sinan Arreola is a 52 y.o. male here for a diabetic eye screening with non-dilated fundus photos per Mariangel Coleman NP.    Patient cooperative?: Yes  Small pupils?: No  Last eye exam: unknown    For exam results, see Encounter Report.

## 2022-12-15 NOTE — TELEPHONE ENCOUNTER
Please let patient know diabetic eye results did not show any findings of retinopathy. It is recommended to repeat in a year for screening.     Thank you

## 2023-05-19 ENCOUNTER — LAB VISIT (OUTPATIENT)
Dept: LAB | Facility: HOSPITAL | Age: 53
End: 2023-05-19
Attending: NURSE PRACTITIONER

## 2023-05-19 DIAGNOSIS — E11.9 TYPE 2 DIABETES MELLITUS WITHOUT COMPLICATION, WITHOUT LONG-TERM CURRENT USE OF INSULIN: ICD-10-CM

## 2023-05-19 LAB
CREAT UR-MCNC: 164.1 MG/DL (ref 63–166)
EST. AVERAGE GLUCOSE BLD GHB EST-MCNC: 194.4 MG/DL
HBA1C MFR BLD: 8.4 %
MICROALBUMIN UR-MCNC: 59.8 UG/ML
MICROALBUMIN/CREAT RATIO PNL UR: 36.4 MG/GM CR (ref 0–30)

## 2023-05-19 PROCEDURE — 82043 UR ALBUMIN QUANTITATIVE: CPT

## 2023-05-19 PROCEDURE — 36415 COLL VENOUS BLD VENIPUNCTURE: CPT

## 2023-05-19 PROCEDURE — 83036 HEMOGLOBIN GLYCOSYLATED A1C: CPT

## 2023-05-23 ENCOUNTER — OFFICE VISIT (OUTPATIENT)
Dept: INTERNAL MEDICINE | Facility: CLINIC | Age: 53
End: 2023-05-23

## 2023-05-23 VITALS
SYSTOLIC BLOOD PRESSURE: 136 MMHG | TEMPERATURE: 98 F | DIASTOLIC BLOOD PRESSURE: 86 MMHG | HEIGHT: 65 IN | WEIGHT: 204 LBS | BODY MASS INDEX: 33.99 KG/M2 | HEART RATE: 83 BPM

## 2023-05-23 DIAGNOSIS — M43.16 ANTEROLISTHESIS OF LUMBAR SPINE: ICD-10-CM

## 2023-05-23 DIAGNOSIS — Z79.4 TYPE 2 DIABETES MELLITUS WITHOUT COMPLICATION, WITH LONG-TERM CURRENT USE OF INSULIN: Primary | ICD-10-CM

## 2023-05-23 DIAGNOSIS — I10 PRIMARY HYPERTENSION: ICD-10-CM

## 2023-05-23 DIAGNOSIS — E78.5 HYPERLIPIDEMIA, UNSPECIFIED HYPERLIPIDEMIA TYPE: ICD-10-CM

## 2023-05-23 DIAGNOSIS — Z12.5 PROSTATE CANCER SCREENING: ICD-10-CM

## 2023-05-23 DIAGNOSIS — E11.9 TYPE 2 DIABETES MELLITUS WITHOUT COMPLICATION, WITH LONG-TERM CURRENT USE OF INSULIN: Primary | ICD-10-CM

## 2023-05-23 DIAGNOSIS — Z12.11 COLON CANCER SCREENING: ICD-10-CM

## 2023-05-23 PROBLEM — F43.21 GRIEVING: Status: RESOLVED | Noted: 2022-12-15 | Resolved: 2023-05-23

## 2023-05-23 PROCEDURE — 99214 PR OFFICE/OUTPT VISIT, EST, LEVL IV, 30-39 MIN: ICD-10-PCS | Mod: S$PBB,,, | Performed by: NURSE PRACTITIONER

## 2023-05-23 PROCEDURE — 99213 OFFICE O/P EST LOW 20 MIN: CPT | Mod: PBBFAC | Performed by: NURSE PRACTITIONER

## 2023-05-23 PROCEDURE — 99214 OFFICE O/P EST MOD 30 MIN: CPT | Mod: S$PBB,,, | Performed by: NURSE PRACTITIONER

## 2023-05-23 RX ORDER — LISINOPRIL 2.5 MG/1
2.5 TABLET ORAL DAILY
Qty: 90 TABLET | Refills: 2 | Status: SHIPPED | OUTPATIENT
Start: 2023-05-23 | End: 2024-01-09 | Stop reason: SDUPTHER

## 2023-05-23 RX ORDER — AMLODIPINE BESYLATE 10 MG/1
10 TABLET ORAL DAILY
Qty: 90 TABLET | Refills: 2 | Status: SHIPPED | OUTPATIENT
Start: 2023-05-23 | End: 2024-01-09 | Stop reason: SDUPTHER

## 2023-05-23 RX ORDER — BACLOFEN 10 MG/1
10 TABLET ORAL 2 TIMES DAILY PRN
Qty: 60 TABLET | Refills: 1 | Status: SHIPPED | OUTPATIENT
Start: 2023-05-23 | End: 2023-08-29 | Stop reason: ALTCHOICE

## 2023-05-23 RX ORDER — MELOXICAM 15 MG/1
15 TABLET ORAL DAILY
Qty: 30 TABLET | Refills: 3 | Status: SHIPPED | OUTPATIENT
Start: 2023-05-23 | End: 2023-10-10 | Stop reason: SDUPTHER

## 2023-05-23 RX ORDER — GLIPIZIDE 5 MG/1
5 TABLET ORAL 2 TIMES DAILY WITH MEALS
Qty: 180 TABLET | Refills: 2 | Status: SHIPPED | OUTPATIENT
Start: 2023-05-23 | End: 2024-01-09 | Stop reason: SDUPTHER

## 2023-05-23 RX ORDER — OMEPRAZOLE 20 MG/1
20 CAPSULE, DELAYED RELEASE ORAL DAILY
Qty: 90 CAPSULE | Refills: 2 | Status: SHIPPED | OUTPATIENT
Start: 2023-05-23 | End: 2024-01-09 | Stop reason: SDUPTHER

## 2023-05-23 RX ORDER — ROSUVASTATIN CALCIUM 40 MG/1
40 TABLET, COATED ORAL NIGHTLY
Qty: 90 TABLET | Refills: 2 | Status: SHIPPED | OUTPATIENT
Start: 2023-05-23 | End: 2024-01-09 | Stop reason: SDUPTHER

## 2023-05-23 RX ORDER — METFORMIN HYDROCHLORIDE 1000 MG/1
1000 TABLET ORAL 2 TIMES DAILY WITH MEALS
Qty: 180 TABLET | Refills: 2 | Status: SHIPPED | OUTPATIENT
Start: 2023-05-23 | End: 2024-01-09 | Stop reason: SDUPTHER

## 2023-05-23 RX ORDER — INSULIN GLARGINE 100 [IU]/ML
40 INJECTION, SOLUTION SUBCUTANEOUS NIGHTLY
Qty: 15 ML | Refills: 6 | Status: SHIPPED | OUTPATIENT
Start: 2023-05-23 | End: 2023-08-29 | Stop reason: SDUPTHER

## 2023-05-23 NOTE — PROGRESS NOTES
Mariangel L Virginia, NP   [facility]  [address] [facility] [address]      PATIENT NAME: Sinan Arreola  : 1970  DATE: 23  MRN: [id number]        Reason for Visit / Chief Complaint: Follow-up (Lab results), Diabetes, and Back Pain       History of Present Illness / Problem Focused Workflow     Sinan Arreola presents to the clinic with Follow-up (Lab results), Diabetes, and Back Pain     54 yo  male for routine follow up/ lab results.  PMhx includes BCC, type 2 DM, HTN, HLD, obesity, anterior listhesis lumbar disc, h/o tobacco abuse.   /86.  A1c 8.4% (worsened, was 7.9%).  Admits to noncompliance with medications.  Continues with c/o chronic low back pain and radiating to legs.  Denies any lower extremity weakness or numbness.  He states he is taking care of his wife who is approximately 80 kg who just had amputation.  He is doing a lot of heavy lifting at home.  Otherwise, he denies any other concerns or complaints.        Review of Systems     Review of Systems   Constitutional:  Negative for appetite change, chills, fatigue, fever and unexpected weight change.   HENT:  Negative for congestion, ear pain, hearing loss, sinus pressure, sore throat and tinnitus.    Respiratory:  Negative for cough, chest tightness, shortness of breath and wheezing.    Cardiovascular:  Negative for chest pain, palpitations and leg swelling.   Gastrointestinal:  Negative for abdominal distention, abdominal pain, blood in stool, constipation, diarrhea, nausea and vomiting.   Genitourinary:  Negative for dysuria and hematuria.   Musculoskeletal:  Positive for back pain. Negative for arthralgias and myalgias.   Skin:  Negative for pallor.   Allergic/Immunologic: Negative for environmental allergies.   Neurological:  Negative for dizziness, syncope, weakness, numbness and headaches.   Hematological:  Negative for  "adenopathy. Does not bruise/bleed easily.   Psychiatric/Behavioral:  Negative for agitation, behavioral problems, confusion, hallucinations, sleep disturbance and suicidal ideas. The patient is not nervous/anxious.      Medical / Social / Family History     No past medical history on file.     Past Surgical History:   Procedure Laterality Date    CHOLECYSTECTOMY      SKIN CANCER EXCISION  2021    head       Social History     Socioeconomic History    Marital status: Single   Tobacco Use    Smoking status: Never    Smokeless tobacco: Never   Substance and Sexual Activity    Alcohol use: Not Currently    Drug use: Never        Family History   Family history unknown: Yes        Medications and Allergies     Medications  Current Outpatient Medications   Medication Instructions    amLODIPine (NORVASC) 10 mg, Oral, Daily    aspirin (ECOTRIN) 81 mg, Oral, Daily    baclofen (LIORESAL) 10 mg, Oral, 2 times daily PRN, Do not drive or drink alcohol    glipiZIDE (GLUCOTROL) 5 mg, Oral, 2 times daily with meals    insulin (BASAGLAR KWIKPEN U-100 INSULIN) 40 Units, Subcutaneous, Nightly    lisinopriL (PRINIVIL,ZESTRIL) 2.5 mg, Oral, Daily    meloxicam (MOBIC) 15 mg, Oral, Daily, Take with food/ avoid other NSAIDs    metFORMIN (GLUCOPHAGE) 1,000 mg, Oral, 2 times daily with meals    omeprazole (PRILOSEC) 20 mg, Oral, Daily    rosuvastatin (CRESTOR) 40 mg, Oral, Nightly    SITagliptin phosphate (JANUVIA) 100 mg, Oral, Daily    TECHLITE PEN NEEDLE 32 gauge x 5/32" Ndle USE DAILY FOR INSULIN ADMINISTRATION    triamcinolone acetonide 0.1% (KENALOG) 0.1 % cream Topical (Top), 2 times daily       Allergies  Review of patient's allergies indicates:  No Known Allergies    Physical Examination     Visit Vitals  /86 (BP Location: Left arm, Patient Position: Sitting, BP Method: Large (Automatic))   Pulse 83   Temp 98 °F (36.7 °C) (Oral)   Ht 5' 5" (1.651 m)   Wt 92.5 kg (204 lb)   BMI 33.95 kg/m²       Physical Exam  Vitals and " nursing note reviewed.   Constitutional:       Appearance: Normal appearance. He is not ill-appearing.   HENT:      Head: Normocephalic.   Cardiovascular:      Rate and Rhythm: Normal rate and regular rhythm.      Pulses: Normal pulses.   Pulmonary:      Effort: Pulmonary effort is normal. No respiratory distress.      Breath sounds: Normal breath sounds.   Musculoskeletal:         General: Normal range of motion.      Cervical back: Normal range of motion and neck supple. No tenderness.      Lumbar back: Tenderness and bony tenderness present. No swelling.      Right lower leg: No edema.      Left lower leg: No edema.   Lymphadenopathy:      Cervical: No cervical adenopathy.   Skin:     General: Skin is warm and dry.      Capillary Refill: Capillary refill takes less than 2 seconds.   Neurological:      Mental Status: He is alert and oriented to person, place, and time. Mental status is at baseline.      Motor: No weakness.   Psychiatric:         Mood and Affect: Mood normal.         Behavior: Behavior normal.         Thought Content: Thought content normal.         Judgment: Judgment normal.         Results     Lab Results   Component Value Date    WBC 9.1 12/12/2022    RBC 4.98 12/12/2022    HGB 14.3 12/12/2022    HCT 42.5 12/12/2022    MCV 85.3 12/12/2022    MCH 28.7 12/12/2022    MCHC 33.6 12/12/2022    RDW 12.9 12/12/2022     12/12/2022    MPV 10.5 (H) 12/12/2022     Sodium Level   Date Value Ref Range Status   12/12/2022 139 136 - 145 mmol/L Final     Potassium Level   Date Value Ref Range Status   12/12/2022 3.9 3.5 - 5.1 mmol/L Final     Carbon Dioxide   Date Value Ref Range Status   12/12/2022 23 22 - 29 mmol/L Final     Blood Urea Nitrogen   Date Value Ref Range Status   12/12/2022 13.9 8.4 - 25.7 mg/dL Final     Creatinine   Date Value Ref Range Status   12/12/2022 0.92 0.73 - 1.18 mg/dL Final     Calcium Level Total   Date Value Ref Range Status   12/12/2022 9.3 8.4 - 10.2 mg/dL Final     Albumin  Level   Date Value Ref Range Status   12/12/2022 4.3 3.5 - 5.0 gm/dL Final     Bilirubin Total   Date Value Ref Range Status   12/12/2022 0.5 <=1.5 mg/dL Final     Alkaline Phosphatase   Date Value Ref Range Status   12/12/2022 78 40 - 150 unit/L Final     Aspartate Aminotransferase   Date Value Ref Range Status   12/12/2022 19 5 - 34 unit/L Final     Alanine Aminotransferase   Date Value Ref Range Status   12/12/2022 41 0 - 55 unit/L Final     Estimated GFR-Non    Date Value Ref Range Status   06/10/2022 >60 mls/min/1.73/m2 Final     Lab Results   Component Value Date    CHOL 91 12/12/2022     Lab Results   Component Value Date    HDL 33 (L) 12/12/2022     No results found for: LDLCALC  Lab Results   Component Value Date    TRIG 105 12/12/2022     No results found for: CHOLHDL  Lab Results   Component Value Date    TSH 1.7686 12/12/2022     Lab Results   Component Value Date    PHUR 6.5 07/30/2021    PROTEINUA 10 (A) 07/30/2021    GLUCUA 30 (A) 07/30/2021    KETONESU Negative 07/30/2021    OCCULTUA Negative 07/30/2021    NITRITE Negative 07/30/2021    LEUKOCYTESUR Negative 07/30/2021     Lab Results   Component Value Date    HGBA1C 8.4 (H) 05/19/2023    HGBA1C 7.9 (H) 12/12/2022    HGBA1C 9.0 (H) 09/12/2022     No results found for: MICROALBUR, [id number]   Results for orders placed in visit on 12/15/22    Diabetic Eye Screening Photo         Assessment       ICD-10-CM ICD-9-CM   1. Type 2 diabetes mellitus without complication, with long-term current use of insulin  E11.9 250.00    Z79.4 V58.67   2. Anterolisthesis of lumbar spine  M43.16 756.12   3. Primary hypertension  I10 401.9   4. BMI 33.0-33.9,adult  Z68.33 V85.33   5. Colon cancer screening  Z12.11 V76.51   6. Hyperlipidemia, unspecified hyperlipidemia type  E78.5 272.4   7. Prostate cancer screening  Z12.5 V76.44       Plan       1. Anterolisthesis of lumbar spine  XR lumbar spine 6/15/22  FINDINGS:  There are 5 non-rib-bearing lumbar type  vertebral bodies.  There is grade 1 anterolisthesis of L5 over S1.  The vertebral body heights are maintained.  There is mild disc height loss at L4-5 and L5-S1 with small marginal osteophytes.  There is mild facet arthropathy in the lower lumbar spine.  There are bilateral L5 pars defects.  The soft tissues are unremarkable.     Impression:     1. No acute abnormality identified.  2. Bilateral L5 pars defects with grade 1 anterolisthesis of L5.    Recommend PT however patient is self pain does not have insurance and unable to afford out-of-pocket cost.  Inform patient to review exercises per discharge instructions and encouraged at least twice daily   Monitor for worsening symptoms such as numbness, tingling, weakness in lower extremities or loss of bowel or bladder function and increase in pain patient to notify provider  Discouraged heavy lifting and encouraged him to look into care options for his wife to assist with ADLs and lifting  Rx meloxicam and baclofen as directed/p.r.n.  - meloxicam (MOBIC) 15 MG tablet; Take 1 tablet (15 mg total) by mouth once daily. Take with food/ avoid other NSAIDs  Dispense: 30 tablet; Refill: 3  - baclofen (LIORESAL) 10 MG tablet; Take 1 tablet (10 mg total) by mouth 2 (two) times daily as needed (muscle pain). Do not drive or drink alcohol  Dispense: 60 tablet; Refill: 1    2. Type 2 diabetes mellitus without complication, with long-term current use of insulin  A1c 8.4%- reports noncompliance with medications   CBGs:   Hypoglycemia: denies  Educated on ADA diet:  1. Avoid/ decrease high carbohydrate foods (rice, pasta, bread, candy, sweets, carbonated beverages)  Educated on health benefits of at least 5 days/ week of 30 minutes moderate intensity exercise (brisk walking) and 2 or more days/ week of muscle strength activities  Avoid alcohol or tobacco use if applicable  Eye exam:  12/15/22 fundus photos with no diabetic retinopathy  Foot exam: 9/15/22  ACEI/ ARB- lisinopril 2.5  mg once daily   Continue ASA 81 mg once daily and statin  Continue medications as prescribed - unable to make changes with medications as patient reports noncompliance.  Encourage patient compliance with medications and ADA diet with follow up in 3 months with labs  - lisinopriL (PRINIVIL,ZESTRIL) 2.5 MG tablet; Take 1 tablet (2.5 mg total) by mouth once daily.  Dispense: 90 tablet; Refill: 2  - glipiZIDE (GLUCOTROL) 5 MG tablet; Take 1 tablet (5 mg total) by mouth 2 (two) times daily with meals.  Dispense: 180 tablet; Refill: 2  - metFORMIN (GLUCOPHAGE) 1000 MG tablet; Take 1 tablet (1,000 mg total) by mouth 2 (two) times daily with meals.  Dispense: 180 tablet; Refill: 2  - rosuvastatin (CRESTOR) 40 MG Tab; Take 1 tablet (40 mg total) by mouth every evening.  Dispense: 90 tablet; Refill: 2  - SITagliptin phosphate (JANUVIA) 100 MG Tab; Take 1 tablet (100 mg total) by mouth once daily.  Dispense: 90 tablet; Refill: 2  - insulin (BASAGLAR KWIKPEN U-100 INSULIN) glargine 100 units/mL SubQ pen; Inject 40 Units into the skin every evening.  Dispense: 15 mL; Refill: 6  - Hemoglobin A1C; Future  - Microalbumin/Creatinine Ratio, Urine; Future    3. Primary hypertension  /86  Follow low sodium diet, < 2 gm/day (avoid high salty foods such as processed meats/ sausage/oneal/ sandwich meat, chips, pickles, cheese, crackers and soft drinks/ electrolyte replacement drinks).  Avoid tobacco/ alcohol use  Educated on health benefits of at least 5 days/ week of 30 minutes moderate intensity exercise (brisk walking) and 2 or more days/ week of muscle strength activities  Daily ASA 81 mg for CV prevention  Continue current medication regimen    - amLODIPine (NORVASC) 10 MG tablet; Take 1 tablet (10 mg total) by mouth once daily.  Dispense: 90 tablet; Refill: 2  - lisinopriL (PRINIVIL,ZESTRIL) 2.5 MG tablet; Take 1 tablet (2.5 mg total) by mouth once daily.  Dispense: 90 tablet; Refill: 2  - Microalbumin/Creatinine Ratio, Urine;  Future    4. BMI 33.0-33.9,adult  BMI 33.95  Educated on increased risk of disease s/t obesity.  Educated on health benefits of at least 5 days/ week of 30 minutes moderate intensity exercise (brisk walking) and 2 or more days/ week of muscle strength activities (as tolerated).  Eat well balanced diet of fresh fruits/ vegetables, whole grains, lean meats and limit high carbohydrate foods.       5. Colon cancer screening    - OCCULT BLOOD FECAL IMMUNOASSAY; Future  - OCCULT BLOOD FECAL IMMUNOASSAY    6. Hyperlipidemia, unspecified hyperlipidemia type  LDL 37 12/22/22  Avoid tobacco/ alcohol  Follow low fat/low cholesterol diet such as avoid/ decrease oneal, sausage, fried foods, cookies, cakes, chips, cheese, whole milk, butter, mayonnaise. Add olive oil, avocados, lean meats, fresh fruits/ vegetables, heart healthy nuts to diet.  Educated on health benefits of exercise 5 days/ week of at least 30 minutes moderate intensity exercise (brisk walking) and 2 days/ week of muscle strength activities  Daily ASA 81 mg for CV prevention  Continue current medication regimen    - rosuvastatin (CRESTOR) 40 MG Tab; Take 1 tablet (40 mg total) by mouth every evening.  Dispense: 90 tablet; Refill: 2    7. Prostate cancer screening    - PSA, Screening; Future      Future Appointments   Date Time Provider Department Center   8/29/2023  7:30 AM Mariangel Coleman NP [facility]        Follow up in about 3 months (around 8/23/2023) for with fasting labs before visit.    Signature:  Mariangel Coleman NP  [facility]  [address] [facility] [address]    Date of encounter: 5/23/23

## 2023-05-26 ENCOUNTER — APPOINTMENT (OUTPATIENT)
Dept: LAB | Facility: HOSPITAL | Age: 53
End: 2023-05-26
Attending: NURSE PRACTITIONER

## 2023-05-26 LAB
OB IA INT NEG CNTRL (OHS): NEGATIVE
OB IA INT POS CNTRL (OHS): POSITIVE
OCCULT BLD IA (OHS): NEGATIVE

## 2023-05-31 ENCOUNTER — TELEPHONE (OUTPATIENT)
Dept: INTERNAL MEDICINE | Facility: CLINIC | Age: 53
End: 2023-05-31

## 2023-05-31 NOTE — TELEPHONE ENCOUNTER
Please let pt know stool test was normal/ no blood noted. It is recommended to repeat screening in a year.     Thank you

## 2023-08-24 ENCOUNTER — LAB VISIT (OUTPATIENT)
Dept: LAB | Facility: HOSPITAL | Age: 53
End: 2023-08-24
Attending: NURSE PRACTITIONER

## 2023-08-24 DIAGNOSIS — Z79.4 TYPE 2 DIABETES MELLITUS WITHOUT COMPLICATION, WITH LONG-TERM CURRENT USE OF INSULIN: ICD-10-CM

## 2023-08-24 DIAGNOSIS — E11.9 TYPE 2 DIABETES MELLITUS WITHOUT COMPLICATION, WITH LONG-TERM CURRENT USE OF INSULIN: ICD-10-CM

## 2023-08-24 DIAGNOSIS — Z12.5 PROSTATE CANCER SCREENING: ICD-10-CM

## 2023-08-24 LAB
EST. AVERAGE GLUCOSE BLD GHB EST-MCNC: 217.3 MG/DL
HBA1C MFR BLD: 9.2 %
PSA SERPL-MCNC: 0.51 NG/ML

## 2023-08-24 PROCEDURE — 84153 ASSAY OF PSA TOTAL: CPT

## 2023-08-24 PROCEDURE — 83036 HEMOGLOBIN GLYCOSYLATED A1C: CPT

## 2023-08-24 PROCEDURE — 36415 COLL VENOUS BLD VENIPUNCTURE: CPT

## 2023-08-29 ENCOUNTER — HOSPITAL ENCOUNTER (OUTPATIENT)
Dept: RADIOLOGY | Facility: HOSPITAL | Age: 53
Discharge: HOME | End: 2023-08-29
Attending: NURSE PRACTITIONER

## 2023-08-29 ENCOUNTER — OFFICE VISIT (OUTPATIENT)
Dept: INTERNAL MEDICINE | Facility: CLINIC | Age: 53
End: 2023-08-29

## 2023-08-29 VITALS
HEART RATE: 83 BPM | BODY MASS INDEX: 33.55 KG/M2 | SYSTOLIC BLOOD PRESSURE: 134 MMHG | TEMPERATURE: 98 F | WEIGHT: 201.38 LBS | RESPIRATION RATE: 20 BRPM | HEIGHT: 65 IN | DIASTOLIC BLOOD PRESSURE: 76 MMHG

## 2023-08-29 DIAGNOSIS — R07.89 INTERMITTENT LEFT-SIDED CHEST PAIN: ICD-10-CM

## 2023-08-29 DIAGNOSIS — M54.32 LEFT SIDED SCIATICA: ICD-10-CM

## 2023-08-29 DIAGNOSIS — E11.9 TYPE 2 DIABETES MELLITUS WITHOUT COMPLICATION, WITH LONG-TERM CURRENT USE OF INSULIN: Primary | ICD-10-CM

## 2023-08-29 DIAGNOSIS — R51.9 NEW ONSET OF HEADACHES: ICD-10-CM

## 2023-08-29 DIAGNOSIS — Z79.4 TYPE 2 DIABETES MELLITUS WITHOUT COMPLICATION, WITH LONG-TERM CURRENT USE OF INSULIN: Primary | ICD-10-CM

## 2023-08-29 DIAGNOSIS — M43.16 ANTEROLISTHESIS OF LUMBAR SPINE: ICD-10-CM

## 2023-08-29 DIAGNOSIS — I10 PRIMARY HYPERTENSION: ICD-10-CM

## 2023-08-29 DIAGNOSIS — M47.812 CERVICAL SPONDYLOSIS: ICD-10-CM

## 2023-08-29 PROCEDURE — 93005 ELECTROCARDIOGRAM TRACING: CPT

## 2023-08-29 PROCEDURE — 99214 PR OFFICE/OUTPT VISIT, EST, LEVL IV, 30-39 MIN: ICD-10-PCS | Mod: S$PBB,,, | Performed by: NURSE PRACTITIONER

## 2023-08-29 PROCEDURE — 99214 OFFICE O/P EST MOD 30 MIN: CPT | Mod: S$PBB,,, | Performed by: NURSE PRACTITIONER

## 2023-08-29 PROCEDURE — 72040 X-RAY EXAM NECK SPINE 2-3 VW: CPT | Mod: TC

## 2023-08-29 PROCEDURE — 99215 OFFICE O/P EST HI 40 MIN: CPT | Mod: PBBFAC,25 | Performed by: NURSE PRACTITIONER

## 2023-08-29 PROCEDURE — 71046 X-RAY EXAM CHEST 2 VIEWS: CPT | Mod: TC

## 2023-08-29 RX ORDER — TIZANIDINE 2 MG/1
4 TABLET ORAL EVERY 8 HOURS PRN
Qty: 30 TABLET | Refills: 0 | Status: SHIPPED | OUTPATIENT
Start: 2023-08-29 | End: 2023-09-08

## 2023-08-29 RX ORDER — INSULIN GLARGINE 100 [IU]/ML
45 INJECTION, SOLUTION SUBCUTANEOUS NIGHTLY
Qty: 15 ML | Refills: 6 | Status: SHIPPED | OUTPATIENT
Start: 2023-08-29 | End: 2024-01-09 | Stop reason: SDUPTHER

## 2023-08-29 NOTE — PROGRESS NOTES
Mariangel L Virginia, NP   [facility]  [address] [facility] [address]      PATIENT NAME: Sinan Arreola  : 1970  DATE: 23  MRN: [id number]        Reason for Visit / Chief Complaint: Results, Low-back Pain, and Leg Pain       History of Present Illness / Problem Focused Workflow     Sinan Arreola presents to the clinic with Results, Low-back Pain, and Leg Pain     52 yo  male for routine follow up/ lab results.  PMhx includes BCC, type 2 DM, HTN, HLD, obesity, anterior listhesis lumbar disc, left rib fracture, h/o tobacco abuse. C/o left sided headaches ongoing x 1 month. Describes as a cramp that lasts a few seconds and goes away on its own.  Rates 2/3. Denies any dizziness, nausea/ vomiting, changes in vision. He does endorse intermittent episodes of nausea. Denies any injuries/ trauma/ fall. C/o chest pain overlying left side of upper chest ongoing x 2 years. He states symptoms are worsening. He states symptoms can last up to 45 minutes when it occurs. He states he was hospitalized for this previously. He mentions when he moves his neck from side to side he feels the left sided upper chest wall pain. He describes as if someone would be pushing on his chest. C/o continued chronic low back pain radiating to bilateral legs. Denies numbness/ weakness or loss of bowel/bladder function. /76. A1c 9.2%. He admits to compliance with medications though he states he has been out of glipizide and needs refills. Otherwise he denies any fever, chills, SOB or other concerns.         Review of Systems     Review of Systems   Constitutional:  Negative for appetite change, chills, fatigue, fever and unexpected weight change.   HENT:  Negative for congestion, ear pain, hearing loss, sinus pressure, sore throat and tinnitus.    Respiratory:  Negative for cough, chest tightness, shortness of breath and wheezing.     Cardiovascular:  Positive for chest pain. Negative for palpitations and leg swelling.   Gastrointestinal:  Negative for abdominal distention, abdominal pain, blood in stool, constipation, diarrhea, nausea and vomiting.   Genitourinary:  Negative for dysuria and hematuria.   Musculoskeletal:  Positive for arthralgias, back pain and myalgias.   Skin:  Negative for pallor.   Allergic/Immunologic: Negative for environmental allergies.   Neurological:  Positive for headaches. Negative for dizziness, syncope, weakness and numbness.   Hematological:  Negative for adenopathy. Does not bruise/bleed easily.   Psychiatric/Behavioral:  Negative for agitation, behavioral problems, confusion, hallucinations, sleep disturbance and suicidal ideas. The patient is not nervous/anxious.        Medical / Social / Family History     No past medical history on file.     Past Surgical History:   Procedure Laterality Date    CHOLECYSTECTOMY      SKIN CANCER EXCISION  2021    head       Social History     Socioeconomic History    Marital status: Single   Tobacco Use    Smoking status: Never    Smokeless tobacco: Never   Substance and Sexual Activity    Alcohol use: Not Currently    Drug use: Never     Social Determinants of Health     Physical Activity: Inactive (8/29/2023)    Exercise Vital Sign     Days of Exercise per Week: 0 days     Minutes of Exercise per Session: 0 min        Family History   Family history unknown: Yes        Medications and Allergies     Medications  Current Outpatient Medications   Medication Instructions    amLODIPine (NORVASC) 10 mg, Oral, Daily    aspirin (ECOTRIN) 81 mg, Oral, Daily    baclofen (LIORESAL) 10 mg, Oral, 2 times daily PRN, Do not drive or drink alcohol    glipiZIDE (GLUCOTROL) 5 mg, Oral, 2 times daily with meals    insulin (BASAGLAR KWIKPEN U-100 INSULIN) 45 Units, Subcutaneous, Nightly    lisinopriL (PRINIVIL,ZESTRIL) 2.5 mg, Oral, Daily    meloxicam (MOBIC) 15 mg, Oral, Daily, Take with food/  "avoid other NSAIDs    metFORMIN (GLUCOPHAGE) 1,000 mg, Oral, 2 times daily with meals    omeprazole (PRILOSEC) 20 mg, Oral, Daily    rosuvastatin (CRESTOR) 40 mg, Oral, Nightly    SITagliptin phosphate (JANUVIA) 100 mg, Oral, Daily    TECHLITE PEN NEEDLE 32 gauge x 5/32" Ndle USE DAILY FOR INSULIN ADMINISTRATION    triamcinolone acetonide 0.1% (KENALOG) 0.1 % cream Topical (Top), 2 times daily       Allergies  Review of patient's allergies indicates:  No Known Allergies    Physical Examination     Visit Vitals  /76 (BP Location: Left arm, Patient Position: Sitting, BP Method: Large (Manual))   Pulse 83   Temp 98.1 °F (36.7 °C) (Oral)   Resp 20   Ht 5' 5" (1.651 m)   Wt 91.4 kg (201 lb 6.4 oz)   BMI 33.51 kg/m²       Physical Exam  Vitals and nursing note reviewed.   Constitutional:       Appearance: Normal appearance. He is not ill-appearing.   HENT:      Head: Normocephalic.      Right Ear: Tympanic membrane, ear canal and external ear normal.      Left Ear: Tympanic membrane, ear canal and external ear normal.      Nose: Nose normal.      Mouth/Throat:      Mouth: Mucous membranes are moist.   Eyes:      Extraocular Movements: Extraocular movements intact.      Conjunctiva/sclera: Conjunctivae normal.      Pupils: Pupils are equal, round, and reactive to light.   Neck:      Vascular: No carotid bruit.   Cardiovascular:      Rate and Rhythm: Normal rate and regular rhythm.      Pulses: Normal pulses.           Dorsalis pedis pulses are 2+ on the right side and 2+ on the left side.        Posterior tibial pulses are 2+ on the right side and 2+ on the left side.   Pulmonary:      Effort: Pulmonary effort is normal. No respiratory distress.      Breath sounds: Normal breath sounds.   Chest:      Chest wall: No mass, deformity, swelling or tenderness.   Musculoskeletal:         General: Normal range of motion.      Cervical back: Normal range of motion and neck supple. No tenderness.      Lumbar back: Tenderness " and bony tenderness present.      Right lower leg: No tenderness or bony tenderness. No edema.      Left lower leg: No tenderness or bony tenderness. No edema.      Comments: Strength BLE 5/5   Lymphadenopathy:      Cervical: No cervical adenopathy.   Skin:     General: Skin is warm and dry.      Capillary Refill: Capillary refill takes less than 2 seconds.   Neurological:      Mental Status: He is alert and oriented to person, place, and time. Mental status is at baseline.      Motor: No weakness.   Psychiatric:         Mood and Affect: Mood normal.         Behavior: Behavior normal.         Thought Content: Thought content normal.         Judgment: Judgment normal.           Results     Lab Results   Component Value Date    WBC 9.1 12/12/2022    RBC 4.98 12/12/2022    HGB 14.3 12/12/2022    HCT 42.5 12/12/2022    MCV 85.3 12/12/2022    MCH 28.7 12/12/2022    MCHC 33.6 12/12/2022    RDW 12.9 12/12/2022     12/12/2022    MPV 10.5 (H) 12/12/2022     Sodium Level   Date Value Ref Range Status   12/12/2022 139 136 - 145 mmol/L Final     Potassium Level   Date Value Ref Range Status   12/12/2022 3.9 3.5 - 5.1 mmol/L Final     Carbon Dioxide   Date Value Ref Range Status   12/12/2022 23 22 - 29 mmol/L Final     Blood Urea Nitrogen   Date Value Ref Range Status   12/12/2022 13.9 8.4 - 25.7 mg/dL Final     Creatinine   Date Value Ref Range Status   12/12/2022 0.92 0.73 - 1.18 mg/dL Final     Calcium Level Total   Date Value Ref Range Status   12/12/2022 9.3 8.4 - 10.2 mg/dL Final     Albumin Level   Date Value Ref Range Status   12/12/2022 4.3 3.5 - 5.0 gm/dL Final     Bilirubin Total   Date Value Ref Range Status   12/12/2022 0.5 <=1.5 mg/dL Final     Alkaline Phosphatase   Date Value Ref Range Status   12/12/2022 78 40 - 150 unit/L Final     Aspartate Aminotransferase   Date Value Ref Range Status   12/12/2022 19 5 - 34 unit/L Final     Alanine Aminotransferase   Date Value Ref Range Status   12/12/2022 41 0 - 55  "unit/L Final     Estimated GFR-Non    Date Value Ref Range Status   06/10/2022 >60 mls/min/1.73/m2 Final     Lab Results   Component Value Date    CHOL 91 12/12/2022     Lab Results   Component Value Date    HDL 33 (L) 12/12/2022     No results found for: "LDLCALC"  Lab Results   Component Value Date    TRIG 105 12/12/2022     No results found for: "CHOLHDL"  Lab Results   Component Value Date    TSH 1.7686 12/12/2022     Lab Results   Component Value Date    PHUR 6.5 07/30/2021    PROTEINUA 10 (A) 07/30/2021    GLUCUA 30 (A) 07/30/2021    KETONESU Negative 07/30/2021    OCCULTUA Negative 07/30/2021    NITRITE Negative 07/30/2021    LEUKOCYTESUR Negative 07/30/2021     Lab Results   Component Value Date    HGBA1C 9.2 (H) 08/24/2023    HGBA1C 8.4 (H) 05/19/2023    HGBA1C 7.9 (H) 12/12/2022     No results found for: "MICROALBUR", "[id number]"   Results for orders placed in visit on 12/15/22    Diabetic Eye Screening Photo         Assessment       ICD-10-CM ICD-9-CM   1. Primary hypertension  I10 401.9   2. Type 2 diabetes mellitus without complication, with long-term current use of insulin  E11.9 250.00    Z79.4 V58.67   3. New onset of headaches  R51.9 784.0   4. Intermittent left-sided chest pain  R07.89 786.59   5. Left sided sciatica  M54.32 724.3   6. Anterolisthesis of lumbar spine  M43.16 756.12   7. Cervical spondylosis  M47.812 721.0       Plan       1. Primary hypertension  /76  Follow low sodium diet, < 2 gm/day (avoid high salty foods such as processed meats/ sausage/oneal/ sandwich meat, chips, pickles, cheese, crackers and soft drinks/ electrolyte replacement drinks).  Avoid tobacco/ alcohol use  Educated on health benefits of at least 5 days/ week of 30 minutes moderate intensity exercise (brisk walking) and 2 or more days/ week of muscle strength activities  Daily ASA 81 mg for CV prevention  Continue current medication regimen      2. Type 2 diabetes mellitus without complication, " with long-term current use of insulin  A1c 9.2%  CBGs:   Hypoglycemia: denies  Microalbumin/ Cr ratio: 49.6  Educated on ADA diet:  1. Avoid/ decrease high carbohydrate foods (rice, pasta, bread, candy, sweets, carbonated beverages)  Educated on health benefits of at least 5 days/ week of 30 minutes moderate intensity exercise (brisk walking) and 2 or more days/ week of muscle strength activities  Avoid alcohol or tobacco use if applicable  Eye exam:  12/15/22 fundus photos with no diabetic retinopathy  Foot exam: 9/15/22  ACEI/ ARB- lisinopril 2.5 mg once daily   Continue ASA 81 mg once daily and statin  Increase Basaglar to 45 units SC q HS.   - insulin (BASAGLAR KWIKPEN U-100 INSULIN) glargine 100 units/mL SubQ pen; Inject 45 Units into the skin every evening.  Dispense: 15 mL; Refill: 6    3. New onset of headaches  Reports new onset of left sided HA x 1 month without accompanied symptoms. Has known history of cervical spondylosis as possible cause though will obtain CT head w/o contrast for further evaluation. Advised should symptoms worsen to report to ER.     4. Intermittent left-sided chest pain  He reports left sided upper wall chest pain that has been present for 2 years and worsening with increased frequency and duration. He reports he was admitted for same approx 2 years ago with work up. Chart notes reviewed echo and EKG at the time. EKG today. CXR today and XR cervical spine. Pt states when he moves his neck he can feel left sided CP. Advised should symptoms worsen to report to ER.   - X-Ray Chest PA And Lateral; Future  - X-Ray Cervical Spine 2 or 3 Views; Future  - IN OFFICE EKG 12-LEAD (to Muse)    5. Left sided sciatica  Patient with known anterolisthesis of lumbar spine with continued lumbago and b/l leg pain without weakness/ numbness or loss of bowel/ bladder function. Pt self pay and unable to afford outpatient PT. Rx baclofen makes him excessively drowsy; discontinue and can try tizanidine as  directed. Encourage HEP. Pt may take OTC Tylenol and Meloxicam PRN    6. Anterolisthesis of lumbar spine  See 5     7. Cervical spondylosis  Known history of cervical spondylosis and may be contributing factor to HA and CP.       Future Appointments   Date Time Provider Department Center   10/10/2023  7:30 AM Mariangel Coleman NP [facility]        Follow up in about 6 weeks (around 10/10/2023) for CP .    Signature:  Mariangel Coleman NP  [facility]  [address] [facility] [address]    Date of encounter: 8/29/23

## 2023-09-11 ENCOUNTER — TELEPHONE (OUTPATIENT)
Dept: INTERNAL MEDICINE | Facility: CLINIC | Age: 53
End: 2023-09-11

## 2023-09-11 NOTE — LETTER
[facility]  [address] [facility] [address]  Phone: [phone] September 29, 2023     Sinan Arreola  [email or web address]  [address]  [facility] [address]      Dear Sinan Perdomo:    Please call our office as soon as possible so that we may reschedule your appointment. If you have already rescheduled your appointment, please disregard this letter.    Sincerely,        Mariangel Coleman NP

## 2023-09-11 NOTE — TELEPHONE ENCOUNTER
Please inform patient chest x-ray and x-ray of neck results reviewed.  Chest x-ray was normal.  X-ray of neck shows arthritis.  Please ask patient how he was feeling in regards to left-sided chest wall pain?  If symptoms have worsened  encourage patient to report to ER.

## 2023-10-09 NOTE — PROGRESS NOTES
Mariangel L Virginia, NP   [facility]  [address] [facility] [address]      PATIENT NAME: Sinan Arreola  : 1970  DATE: 10/10/23  MRN: [id number]        Reason for Visit / Chief Complaint: Follow-up (Chest pain)       History of Present Illness / Problem Focused Workflow     Sinan Arreola presents to the clinic with Follow-up (Chest pain)     23: 54 yo  male for routine follow up/ lab results.  PMhx includes BCC, type 2 DM, HTN, HLD, obesity, anterior listhesis lumbar disc, left rib fracture, h/o tobacco abuse. C/o left sided headaches ongoing x 1 month. Describes as a cramp that lasts a few seconds and goes away on its own.  Rates 2/3. Denies any dizziness, nausea/ vomiting, changes in vision. He does endorse intermittent episodes of nausea. Denies any injuries/ trauma/ fall. C/o chest pain overlying left side of upper chest ongoing x 2 years. He states symptoms are worsening. He states symptoms can last up to 45 minutes when it occurs. He states he was hospitalized for this previously. He mentions when he moves his neck from side to side he feels the left sided upper chest wall pain. He describes as if someone would be pushing on his chest. C/o continued chronic low back pain radiating to bilateral legs. Denies numbness/ weakness or loss of bowel/bladder function. /76. A1c 9.2%. He admits to compliance with medications though he states he has been out of glipizide and needs refills. Otherwise he denies any fever, chills, SOB or other concerns.     10/10/23: Pt for 6 wk f/u for chest pain and neck pain with results. He reports neck pain and left sided chest wall pain improved. He states he feels it was musculoskeletal pain. He states medications helped (Tizanidine and Meloxicam). He denies any other episodes of CP. Denies SOB or other concerns.            Review of Systems     Review of Systems    Constitutional:  Negative for appetite change, chills, fatigue, fever and unexpected weight change.   HENT:  Negative for congestion, ear pain, hearing loss, sinus pressure, sore throat and tinnitus.    Respiratory:  Negative for cough, chest tightness, shortness of breath and wheezing.    Cardiovascular:  Negative for chest pain, palpitations and leg swelling.   Gastrointestinal:  Negative for abdominal distention, abdominal pain, blood in stool, constipation, diarrhea, nausea and vomiting.   Genitourinary:  Negative for dysuria and hematuria.   Musculoskeletal:  Negative for arthralgias and myalgias.   Skin:  Negative for pallor.   Allergic/Immunologic: Negative for environmental allergies.   Neurological:  Negative for dizziness, syncope, weakness, numbness and headaches.   Hematological:  Negative for adenopathy. Does not bruise/bleed easily.   Psychiatric/Behavioral:  Negative for agitation, behavioral problems, confusion, hallucinations, sleep disturbance and suicidal ideas. The patient is not nervous/anxious.        Medical / Social / Family History     No past medical history on file.     Past Surgical History:   Procedure Laterality Date    CHOLECYSTECTOMY      SKIN CANCER EXCISION  2021    head       Social History     Socioeconomic History    Marital status: Single   Tobacco Use    Smoking status: Never    Smokeless tobacco: Never   Substance and Sexual Activity    Alcohol use: Not Currently    Drug use: Never     Social Determinants of Health     Physical Activity: Inactive (8/29/2023)    Exercise Vital Sign     Days of Exercise per Week: 0 days     Minutes of Exercise per Session: 0 min        Family History   Family history unknown: Yes        Medications and Allergies     Medications  Current Outpatient Medications   Medication Instructions    amLODIPine (NORVASC) 10 mg, Oral, Daily    aspirin (ECOTRIN) 81 mg, Oral, Daily    glipiZIDE (GLUCOTROL) 5 mg, Oral, 2 times daily with meals    insulin  "(BASAGLREENA RASHEEDPEN U-100 INSULIN) 45 Units, Subcutaneous, Nightly    lisinopriL (PRINIVIL,ZESTRIL) 2.5 mg, Oral, Daily    meloxicam (MOBIC) 15 mg, Oral, Daily, Take with food/ avoid other NSAIDs    metFORMIN (GLUCOPHAGE) 1,000 mg, Oral, 2 times daily with meals    omeprazole (PRILOSEC) 20 mg, Oral, Daily    rosuvastatin (CRESTOR) 40 mg, Oral, Nightly    SITagliptin phosphate (JANUVIA) 100 mg, Oral, Daily    TECHLITE PEN NEEDLE 32 gauge x 5/32" Ndle USE DAILY FOR INSULIN ADMINISTRATION    tiZANidine (ZANAFLEX) 4 mg, Oral, Every 8 hours PRN    triamcinolone acetonide 0.1% (KENALOG) 0.1 % cream Topical (Top), 2 times daily       Allergies  Review of patient's allergies indicates:  No Known Allergies    Physical Examination     Visit Vitals  /74 (BP Location: Left arm, Patient Position: Sitting, BP Method: Large (Manual))   Pulse 86   Temp 98.3 °F (36.8 °C) (Oral)   Resp 20   Ht 5' 5" (1.651 m)   Wt 91.2 kg (201 lb 1.6 oz)   BMI 33.46 kg/m²       Physical Exam  Vitals and nursing note reviewed.   Constitutional:       Appearance: Normal appearance. He is not ill-appearing.   HENT:      Head: Normocephalic.   Neck:      Thyroid: No thyroid mass, thyromegaly or thyroid tenderness.   Cardiovascular:      Rate and Rhythm: Normal rate and regular rhythm.      Pulses: Normal pulses.   Pulmonary:      Effort: Pulmonary effort is normal. No respiratory distress.      Breath sounds: Normal breath sounds.   Musculoskeletal:         General: Normal range of motion.      Cervical back: Normal range of motion and neck supple. No tenderness.   Lymphadenopathy:      Cervical: No cervical adenopathy.   Skin:     General: Skin is warm and dry.   Neurological:      Mental Status: He is alert and oriented to person, place, and time. Mental status is at baseline.      Motor: No weakness.   Psychiatric:         Mood and Affect: Mood normal.         Behavior: Behavior normal.         Thought Content: Thought content normal.         " "Judgment: Judgment normal.           Results     Lab Results   Component Value Date    WBC 9.1 12/12/2022    RBC 4.98 12/12/2022    HGB 14.3 12/12/2022    HCT 42.5 12/12/2022    MCV 85.3 12/12/2022    MCH 28.7 12/12/2022    MCHC 33.6 12/12/2022    RDW 12.9 12/12/2022     12/12/2022    MPV 10.5 (H) 12/12/2022     Sodium Level   Date Value Ref Range Status   12/12/2022 139 136 - 145 mmol/L Final     Potassium Level   Date Value Ref Range Status   12/12/2022 3.9 3.5 - 5.1 mmol/L Final     Carbon Dioxide   Date Value Ref Range Status   12/12/2022 23 22 - 29 mmol/L Final     Blood Urea Nitrogen   Date Value Ref Range Status   12/12/2022 13.9 8.4 - 25.7 mg/dL Final     Creatinine   Date Value Ref Range Status   12/12/2022 0.92 0.73 - 1.18 mg/dL Final     Calcium Level Total   Date Value Ref Range Status   12/12/2022 9.3 8.4 - 10.2 mg/dL Final     Albumin Level   Date Value Ref Range Status   12/12/2022 4.3 3.5 - 5.0 gm/dL Final     Bilirubin Total   Date Value Ref Range Status   12/12/2022 0.5 <=1.5 mg/dL Final     Alkaline Phosphatase   Date Value Ref Range Status   12/12/2022 78 40 - 150 unit/L Final     Aspartate Aminotransferase   Date Value Ref Range Status   12/12/2022 19 5 - 34 unit/L Final     Alanine Aminotransferase   Date Value Ref Range Status   12/12/2022 41 0 - 55 unit/L Final     Estimated GFR-Non    Date Value Ref Range Status   06/10/2022 >60 mls/min/1.73/m2 Final     Lab Results   Component Value Date    CHOL 91 12/12/2022     Lab Results   Component Value Date    HDL 33 (L) 12/12/2022     No results found for: "LDLCALC"  Lab Results   Component Value Date    TRIG 105 12/12/2022     No results found for: "CHOLHDL"  Lab Results   Component Value Date    TSH 1.7686 12/12/2022     Lab Results   Component Value Date    PHUR 6.5 07/30/2021    PROTEINUA 10 (A) 07/30/2021    GLUCUA 30 (A) 07/30/2021    KETONESU Negative 07/30/2021    OCCULTUA Negative 07/30/2021    NITRITE Negative " "07/30/2021    LEUKOCYTESUR Negative 07/30/2021     Lab Results   Component Value Date    HGBA1C 9.2 (H) 08/24/2023    HGBA1C 8.4 (H) 05/19/2023    HGBA1C 7.9 (H) 12/12/2022     No results found for: "MICROALBUR", "[id number]"   Results for orders placed in visit on 12/15/22    Diabetic Eye Screening Photo         Assessment       ICD-10-CM ICD-9-CM   1. Cervical spondylosis  M47.812 721.0   2. Primary hypertension  I10 401.9   3. Type 2 diabetes mellitus without complication, with long-term current use of insulin  E11.9 250.00    Z79.4 V58.67   4. Left sided sciatica  M54.32 724.3   5. Anterolisthesis of lumbar spine  M43.16 756.12   6. Intermittent left-sided chest pain  R07.89 786.59       Plan       1. Primary hypertension  - CBC Auto Differential; Future  - Comprehensive Metabolic Panel; Future  - Hemoglobin A1C; Future  - Lipid Panel; Future  - TSH; Future    2. Cervical spondylosis  8/29/23 XR cervical spine   FINDINGS:  Vertebral body heights are maintained without appreciable fracture. Normal alignment.  C7 obscured by the shoulders on the lateral view, better visualized on the swimmer's view.     Mild disc space narrowing at C5-C6 with anterior disc osteophyte, similar to previous.     Prevertebral soft tissues are unremarkable.     Impression:     Significant change from previous exam.  Mild degenerative change at lower cervical spine.    Symptoms improved with Rx- refilled. Encourage HEP. Pt self pay and cannot afford PT.   Encouraged daily stretching, hand out provided with specific exercises  Encouraged warm heat and alternate with ice x 20 minutes 3-4xd  Avoid heavy lifting  monitor for worsening symptoms such as increased pain, numbness/tingling, weakness, loss of bowel/bladder function  Rx NSAIDs and muscle relaxer as prescribed; take with food; do not take together (alternate every 4-6 hours) as they may cause you to feel drowsy; do not drive or drink alcohol; avoid other NSAIDs such as Ibuprofen, " Motrin, Aleve, Advil, Naproxen, Mobic, Diclofenac, Indomethacin, Aspirin  - tiZANidine (ZANAFLEX) 4 MG tablet; Take 1 tablet (4 mg total) by mouth every 8 (eight) hours as needed (muscle pain).  Dispense: 30 tablet; Refill: 3  - meloxicam (MOBIC) 15 MG tablet; Take 1 tablet (15 mg total) by mouth once daily. Take with food/ avoid other NSAIDs  Dispense: 30 tablet; Refill: 3    3. Type 2 diabetes mellitus without complication, with long-term current use of insulin  Last a1c 9.2%. Labs with f/u. DM eye/ foot will be due.  - CBC Auto Differential; Future  - Comprehensive Metabolic Panel; Future  - Hemoglobin A1C; Future  - Lipid Panel; Future  - TSH; Future    4. Left sided sciatica  - tiZANidine (ZANAFLEX) 4 MG tablet; Take 1 tablet (4 mg total) by mouth every 8 (eight) hours as needed (muscle pain).  Dispense: 30 tablet; Refill: 3  - meloxicam (MOBIC) 15 MG tablet; Take 1 tablet (15 mg total) by mouth once daily. Take with food/ avoid other NSAIDs  Dispense: 30 tablet; Refill: 3    5. Anterolisthesis of lumbar spine  - tiZANidine (ZANAFLEX) 4 MG tablet; Take 1 tablet (4 mg total) by mouth every 8 (eight) hours as needed (muscle pain).  Dispense: 30 tablet; Refill: 3  - meloxicam (MOBIC) 15 MG tablet; Take 1 tablet (15 mg total) by mouth once daily. Take with food/ avoid other NSAIDs  Dispense: 30 tablet; Refill: 3    6. Intermittent left-sided chest pain  CXR/ EKG completed.  XR chest 8/29/23  FINDINGS:  Cardiopericardial silhouette is within normal limits. Lungs are without dense focal or segmental consolidation, congestion, pleural effusion or pneumothorax.     Impression:     No acute cardiopulmonary process identified.  He denies recurrent episodes of CP since his last visit and states he feels it was MSK pain as it was likely s/t cervical spondylosis- see 2. Rx refilled as above to be used as directed/ PRN.      Future Appointments   Date Time Provider Department Center   12/6/2023  8:45 AM Mariangel Coleman NP  [facility]        Follow up in about 7 weeks (around 11/28/2023) for DM with labs .    Signature:  Mariangel Coleman NP  [facility]  [address] [facility] [address]    Date of encounter: 10/10/23

## 2023-10-10 ENCOUNTER — OFFICE VISIT (OUTPATIENT)
Dept: INTERNAL MEDICINE | Facility: CLINIC | Age: 53
End: 2023-10-10

## 2023-10-10 VITALS
HEIGHT: 65 IN | TEMPERATURE: 98 F | RESPIRATION RATE: 20 BRPM | WEIGHT: 201.13 LBS | DIASTOLIC BLOOD PRESSURE: 74 MMHG | HEART RATE: 86 BPM | BODY MASS INDEX: 33.51 KG/M2 | SYSTOLIC BLOOD PRESSURE: 134 MMHG

## 2023-10-10 DIAGNOSIS — E11.9 TYPE 2 DIABETES MELLITUS WITHOUT COMPLICATION, WITH LONG-TERM CURRENT USE OF INSULIN: ICD-10-CM

## 2023-10-10 DIAGNOSIS — I10 PRIMARY HYPERTENSION: ICD-10-CM

## 2023-10-10 DIAGNOSIS — M43.16 ANTEROLISTHESIS OF LUMBAR SPINE: ICD-10-CM

## 2023-10-10 DIAGNOSIS — M47.812 CERVICAL SPONDYLOSIS: Primary | ICD-10-CM

## 2023-10-10 DIAGNOSIS — M54.32 LEFT SIDED SCIATICA: ICD-10-CM

## 2023-10-10 DIAGNOSIS — Z79.4 TYPE 2 DIABETES MELLITUS WITHOUT COMPLICATION, WITH LONG-TERM CURRENT USE OF INSULIN: ICD-10-CM

## 2023-10-10 DIAGNOSIS — R07.89 INTERMITTENT LEFT-SIDED CHEST PAIN: ICD-10-CM

## 2023-10-10 PROCEDURE — 99214 OFFICE O/P EST MOD 30 MIN: CPT | Mod: PBBFAC | Performed by: NURSE PRACTITIONER

## 2023-10-10 PROCEDURE — 99214 PR OFFICE/OUTPT VISIT, EST, LEVL IV, 30-39 MIN: ICD-10-PCS | Mod: S$PBB,,, | Performed by: NURSE PRACTITIONER

## 2023-10-10 PROCEDURE — 99214 OFFICE O/P EST MOD 30 MIN: CPT | Mod: S$PBB,,, | Performed by: NURSE PRACTITIONER

## 2023-10-10 RX ORDER — TIZANIDINE 4 MG/1
4 TABLET ORAL EVERY 8 HOURS PRN
Qty: 30 TABLET | Refills: 3 | Status: SHIPPED | OUTPATIENT
Start: 2023-10-10

## 2023-10-10 RX ORDER — MELOXICAM 15 MG/1
15 TABLET ORAL DAILY
Qty: 30 TABLET | Refills: 3 | Status: SHIPPED | OUTPATIENT
Start: 2023-10-10

## 2024-01-04 ENCOUNTER — LAB VISIT (OUTPATIENT)
Dept: LAB | Facility: HOSPITAL | Age: 54
End: 2024-01-04
Attending: NURSE PRACTITIONER

## 2024-01-04 DIAGNOSIS — E11.9 TYPE 2 DIABETES MELLITUS WITHOUT COMPLICATION, WITH LONG-TERM CURRENT USE OF INSULIN: ICD-10-CM

## 2024-01-04 DIAGNOSIS — Z79.4 TYPE 2 DIABETES MELLITUS WITHOUT COMPLICATION, WITH LONG-TERM CURRENT USE OF INSULIN: ICD-10-CM

## 2024-01-04 DIAGNOSIS — I10 PRIMARY HYPERTENSION: ICD-10-CM

## 2024-01-04 LAB
ALBUMIN SERPL-MCNC: 4.5 G/DL (ref 3.5–5)
ALBUMIN/GLOB SERPL: 1.2 RATIO (ref 1.1–2)
ALP SERPL-CCNC: 82 UNIT/L (ref 40–150)
ALT SERPL-CCNC: 47 UNIT/L (ref 0–55)
AST SERPL-CCNC: 34 UNIT/L (ref 5–34)
BASOPHILS # BLD AUTO: 0.02 X10(3)/MCL
BASOPHILS NFR BLD AUTO: 0.2 %
BILIRUB SERPL-MCNC: 0.6 MG/DL
BUN SERPL-MCNC: 12.6 MG/DL (ref 8.4–25.7)
CALCIUM SERPL-MCNC: 9.5 MG/DL (ref 8.4–10.2)
CHLORIDE SERPL-SCNC: 106 MMOL/L (ref 98–107)
CHOLEST SERPL-MCNC: 118 MG/DL
CHOLEST/HDLC SERPL: 3 {RATIO} (ref 0–5)
CO2 SERPL-SCNC: 25 MMOL/L (ref 22–29)
CREAT SERPL-MCNC: 0.84 MG/DL (ref 0.73–1.18)
EOSINOPHIL # BLD AUTO: 0.19 X10(3)/MCL (ref 0–0.9)
EOSINOPHIL NFR BLD AUTO: 2.3 %
ERYTHROCYTE [DISTWIDTH] IN BLOOD BY AUTOMATED COUNT: 12.8 % (ref 11.5–17)
EST. AVERAGE GLUCOSE BLD GHB EST-MCNC: 182.9 MG/DL
GFR SERPLBLD CREATININE-BSD FMLA CKD-EPI: >60 MLS/MIN/1.73/M2
GLOBULIN SER-MCNC: 3.8 GM/DL (ref 2.4–3.5)
GLUCOSE SERPL-MCNC: 113 MG/DL (ref 74–100)
HBA1C MFR BLD: 8 %
HCT VFR BLD AUTO: 45.8 % (ref 42–52)
HDLC SERPL-MCNC: 36 MG/DL (ref 35–60)
HGB BLD-MCNC: 15.2 G/DL (ref 14–18)
IMM GRANULOCYTES # BLD AUTO: 0.03 X10(3)/MCL (ref 0–0.04)
IMM GRANULOCYTES NFR BLD AUTO: 0.4 %
LDLC SERPL CALC-MCNC: 39 MG/DL (ref 50–140)
LYMPHOCYTES # BLD AUTO: 2.42 X10(3)/MCL (ref 0.6–4.6)
LYMPHOCYTES NFR BLD AUTO: 29.3 %
MCH RBC QN AUTO: 28 PG (ref 27–31)
MCHC RBC AUTO-ENTMCNC: 33.2 G/DL (ref 33–36)
MCV RBC AUTO: 84.3 FL (ref 80–94)
MONOCYTES # BLD AUTO: 0.55 X10(3)/MCL (ref 0.1–1.3)
MONOCYTES NFR BLD AUTO: 6.7 %
NEUTROPHILS # BLD AUTO: 5.05 X10(3)/MCL (ref 2.1–9.2)
NEUTROPHILS NFR BLD AUTO: 61.1 %
NRBC BLD AUTO-RTO: 0 %
PLATELET # BLD AUTO: 246 X10(3)/MCL (ref 130–400)
PMV BLD AUTO: 10.1 FL (ref 7.4–10.4)
POTASSIUM SERPL-SCNC: 3.4 MMOL/L (ref 3.5–5.1)
PROT SERPL-MCNC: 8.3 GM/DL (ref 6.4–8.3)
RBC # BLD AUTO: 5.43 X10(6)/MCL (ref 4.7–6.1)
SODIUM SERPL-SCNC: 141 MMOL/L (ref 136–145)
TRIGL SERPL-MCNC: 214 MG/DL (ref 34–140)
TSH SERPL-ACNC: 1.78 UIU/ML (ref 0.35–4.94)
VLDLC SERPL CALC-MCNC: 43 MG/DL
WBC # SPEC AUTO: 8.26 X10(3)/MCL (ref 4.5–11.5)

## 2024-01-04 PROCEDURE — 83036 HEMOGLOBIN GLYCOSYLATED A1C: CPT

## 2024-01-04 PROCEDURE — 80061 LIPID PANEL: CPT

## 2024-01-04 PROCEDURE — 84443 ASSAY THYROID STIM HORMONE: CPT

## 2024-01-04 PROCEDURE — 85025 COMPLETE CBC W/AUTO DIFF WBC: CPT

## 2024-01-04 PROCEDURE — 36415 COLL VENOUS BLD VENIPUNCTURE: CPT

## 2024-01-04 PROCEDURE — 80053 COMPREHEN METABOLIC PANEL: CPT

## 2024-01-09 ENCOUNTER — CLINICAL SUPPORT (OUTPATIENT)
Dept: INTERNAL MEDICINE | Facility: CLINIC | Age: 54
End: 2024-01-09
Attending: NURSE PRACTITIONER

## 2024-01-09 ENCOUNTER — OFFICE VISIT (OUTPATIENT)
Dept: INTERNAL MEDICINE | Facility: CLINIC | Age: 54
End: 2024-01-09

## 2024-01-09 VITALS
RESPIRATION RATE: 20 BRPM | SYSTOLIC BLOOD PRESSURE: 119 MMHG | WEIGHT: 203.19 LBS | HEART RATE: 87 BPM | DIASTOLIC BLOOD PRESSURE: 74 MMHG | TEMPERATURE: 98 F | HEIGHT: 65 IN | BODY MASS INDEX: 33.85 KG/M2

## 2024-01-09 DIAGNOSIS — E11.9 TYPE 2 DIABETES MELLITUS WITHOUT COMPLICATION, WITHOUT LONG-TERM CURRENT USE OF INSULIN: ICD-10-CM

## 2024-01-09 DIAGNOSIS — I10 HYPERTENSION, UNSPECIFIED TYPE: ICD-10-CM

## 2024-01-09 DIAGNOSIS — Z79.4 TYPE 2 DIABETES MELLITUS WITHOUT COMPLICATION, WITH LONG-TERM CURRENT USE OF INSULIN: ICD-10-CM

## 2024-01-09 DIAGNOSIS — E78.5 HYPERLIPIDEMIA, UNSPECIFIED HYPERLIPIDEMIA TYPE: ICD-10-CM

## 2024-01-09 DIAGNOSIS — E11.9 TYPE 2 DIABETES MELLITUS WITHOUT COMPLICATION, WITH LONG-TERM CURRENT USE OF INSULIN: ICD-10-CM

## 2024-01-09 DIAGNOSIS — I10 PRIMARY HYPERTENSION: ICD-10-CM

## 2024-01-09 PROBLEM — Z12.5 PROSTATE CANCER SCREENING: Status: RESOLVED | Noted: 2023-05-23 | Resolved: 2024-01-09

## 2024-01-09 LAB
LEFT EYE DM RETINOPATHY: NEGATIVE
RIGHT EYE DM RETINOPATHY: NEGATIVE

## 2024-01-09 PROCEDURE — 92228 IMG RTA DETC/MNTR DS PHY/QHP: CPT | Mod: PBBFAC

## 2024-01-09 PROCEDURE — 99214 OFFICE O/P EST MOD 30 MIN: CPT | Mod: S$PBB,,, | Performed by: NURSE PRACTITIONER

## 2024-01-09 PROCEDURE — 99214 OFFICE O/P EST MOD 30 MIN: CPT | Mod: PBBFAC | Performed by: NURSE PRACTITIONER

## 2024-01-09 RX ORDER — LISINOPRIL 2.5 MG/1
2.5 TABLET ORAL DAILY
Qty: 90 TABLET | Refills: 2 | Status: SHIPPED | OUTPATIENT
Start: 2024-01-09

## 2024-01-09 RX ORDER — ROSUVASTATIN CALCIUM 40 MG/1
40 TABLET, COATED ORAL NIGHTLY
Qty: 90 TABLET | Refills: 2 | Status: SHIPPED | OUTPATIENT
Start: 2024-01-09

## 2024-01-09 RX ORDER — ASPIRIN 81 MG/1
81 TABLET ORAL DAILY
Qty: 90 TABLET | Refills: 2 | Status: SHIPPED | OUTPATIENT
Start: 2024-01-09

## 2024-01-09 RX ORDER — AMLODIPINE BESYLATE 10 MG/1
10 TABLET ORAL DAILY
Qty: 90 TABLET | Refills: 2 | Status: SHIPPED | OUTPATIENT
Start: 2024-01-09

## 2024-01-09 RX ORDER — PEN NEEDLE, DIABETIC 32GX 5/32"
NEEDLE, DISPOSABLE MISCELLANEOUS
Qty: 100 EACH | Refills: 3 | Status: SHIPPED | OUTPATIENT
Start: 2024-01-09

## 2024-01-09 RX ORDER — OMEPRAZOLE 20 MG/1
20 CAPSULE, DELAYED RELEASE ORAL DAILY
Qty: 90 CAPSULE | Refills: 2 | Status: SHIPPED | OUTPATIENT
Start: 2024-01-09

## 2024-01-09 RX ORDER — GLIPIZIDE 5 MG/1
5 TABLET ORAL 2 TIMES DAILY WITH MEALS
Qty: 180 TABLET | Refills: 2 | Status: SHIPPED | OUTPATIENT
Start: 2024-01-09

## 2024-01-09 RX ORDER — METFORMIN HYDROCHLORIDE 1000 MG/1
1000 TABLET ORAL 2 TIMES DAILY WITH MEALS
Qty: 180 TABLET | Refills: 2 | Status: SHIPPED | OUTPATIENT
Start: 2024-01-09

## 2024-01-09 RX ORDER — INSULIN GLARGINE 100 [IU]/ML
45 INJECTION, SOLUTION SUBCUTANEOUS NIGHTLY
Qty: 15 ML | Refills: 6 | Status: SHIPPED | OUTPATIENT
Start: 2024-01-09

## 2024-01-09 NOTE — ASSESSMENT & PLAN NOTE
A1c 8% improved from 9.2%  CBGs: 124 this morning  Hypoglycemia: denies  Microalbumin/ Cr ratio: 49.6 8/24/23  Educated on ADA diet:  1. Avoid/ decrease high carbohydrate foods (rice, pasta, bread, candy, sweets, carbonated beverages)  Educated on health benefits of at least 5 days/ week of 30 minutes moderate intensity exercise (brisk walking) and 2 or more days/ week of muscle strength activities  Avoid alcohol or tobacco use if applicable  Eye exam:  12/15/22 fundus photos with no diabetic retinopathy.  Fundus photos order today.  Foot exam: 1/9/24  ACEI/ ARB- lisinopril 2.5 mg once daily   Continue ASA 81 mg once daily and statin  Patient to increase glipizide b.i.d. as prescribed

## 2024-01-09 NOTE — ASSESSMENT & PLAN NOTE
BP Readings from Last 3 Encounters:   01/09/24 119/74   10/10/23 134/74   08/29/23 134/76     Follow low sodium diet, < 2 gm/day (avoid high salty foods such as processed meats/ sausage/oneal/ sandwich meat, chips, pickles, cheese, crackers and soft drinks/ electrolyte replacement drinks).  Avoid tobacco/ alcohol use  Educated on health benefits of at least 5 days/ week of 30 minutes moderate intensity exercise (brisk walking) and 2 or more days/ week of muscle strength activities  Daily ASA 81 mg for CV prevention  Continue current medication regimen

## 2024-01-09 NOTE — PROGRESS NOTES
Mariangel L Virginia, NP   [facility]  [address] [facility] [address]      PATIENT NAME: Sinan Arreola  : 1970  DATE: 24  MRN: [id number]        Reason for Visit / Chief Complaint: Results       History of Present Illness / Problem Focused Workflow     Sinan Arreola presents to the clinic with Results     54 yo  male for follow up/ lab results.  PMhx includes BCC, type 2 DM, HTN, HLD, obesity, anterior listhesis lumbar disc, left rib fracture, h/o tobacco abuse. He reports doing well. CBG this morning 124 fasting. A1c improved to 8%. Admits to taking Glipizide only once daily. Due for DM foot/ eye exam. /74. Denies any other concerns/ complaints, CP, SOB.           Review of Systems     Review of Systems   Constitutional:  Negative for appetite change, chills, fatigue, fever and unexpected weight change.   HENT:  Negative for congestion, ear pain, hearing loss, sinus pressure, sore throat and tinnitus.    Respiratory:  Negative for cough, chest tightness, shortness of breath and wheezing.    Cardiovascular:  Negative for chest pain, palpitations and leg swelling.   Gastrointestinal:  Negative for abdominal distention, abdominal pain, blood in stool, constipation, diarrhea, nausea and vomiting.   Genitourinary:  Negative for dysuria and hematuria.   Musculoskeletal:  Negative for arthralgias and myalgias.   Skin:  Negative for pallor.   Allergic/Immunologic: Negative for environmental allergies.   Neurological:  Negative for dizziness, syncope, weakness, numbness and headaches.   Hematological:  Negative for adenopathy. Does not bruise/bleed easily.   Psychiatric/Behavioral:  Negative for agitation, behavioral problems, confusion, hallucinations, sleep disturbance and suicidal ideas. The patient is not nervous/anxious.        Medical / Social / Family History     No past medical history on file.  "    Past Surgical History:   Procedure Laterality Date    CHOLECYSTECTOMY      SKIN CANCER EXCISION  2021    head       Social History     Socioeconomic History    Marital status: Single   Tobacco Use    Smoking status: Never    Smokeless tobacco: Never   Substance and Sexual Activity    Alcohol use: Not Currently    Drug use: Never     Social Determinants of Health     Physical Activity: Inactive (1/9/2024)    Exercise Vital Sign     Days of Exercise per Week: 0 days     Minutes of Exercise per Session: 0 min        Family History   Family history unknown: Yes        Medications and Allergies     Medications  Current Outpatient Medications   Medication Instructions    amLODIPine (NORVASC) 10 mg, Oral, Daily    aspirin (ECOTRIN) 81 mg, Oral, Daily    glipiZIDE (GLUCOTROL) 5 mg, Oral, 2 times daily with meals    insulin (BASAGLAR KWIKPEN U-100 INSULIN) 45 Units, Subcutaneous, Nightly    lisinopriL (PRINIVIL,ZESTRIL) 2.5 mg, Oral, Daily    meloxicam (MOBIC) 15 mg, Oral, Daily, Take with food/ avoid other NSAIDs    metFORMIN (GLUCOPHAGE) 1,000 mg, Oral, 2 times daily with meals    omeprazole (PRILOSEC) 20 mg, Oral, Daily    rosuvastatin (CRESTOR) 40 mg, Oral, Nightly    SITagliptin phosphate (JANUVIA) 100 mg, Oral, Daily    TECHLITE PEN NEEDLE 32 gauge x 5/32" Ndle USE DAILY FOR INSULIN ADMINISTRATION    tiZANidine (ZANAFLEX) 4 mg, Oral, Every 8 hours PRN    triamcinolone acetonide 0.1% (KENALOG) 0.1 % cream Topical (Top), 2 times daily       Allergies  Review of patient's allergies indicates:  No Known Allergies    Physical Examination     Visit Vitals  /74 (BP Location: Left arm, Patient Position: Sitting, BP Method: Large (Automatic))   Pulse 87   Temp 98 °F (36.7 °C) (Oral)   Resp 20   Ht 5' 5" (1.651 m)   Wt 92.2 kg (203 lb 3.2 oz)   BMI 33.81 kg/m²       Physical Exam  Constitutional:       General: He is not in acute distress.     Appearance: Normal appearance. He is normal weight. He is not ill-appearing, " toxic-appearing or diaphoretic.   HENT:      Head: Normocephalic.   Cardiovascular:      Rate and Rhythm: Normal rate and regular rhythm.      Pulses:           Dorsalis pedis pulses are 2+ on the right side and 2+ on the left side.        Posterior tibial pulses are 2+ on the right side and 2+ on the left side.      Heart sounds: Normal heart sounds.   Pulmonary:      Effort: Pulmonary effort is normal. No respiratory distress.      Breath sounds: Normal breath sounds. No stridor. No wheezing, rhonchi or rales.   Musculoskeletal:      Cervical back: No tenderness.   Feet:      Right foot:      Protective Sensation: 5 sites tested.  5 sites sensed.      Skin integrity: Skin integrity normal.      Toenail Condition: Right toenails are normal.      Left foot:      Protective Sensation: 5 sites tested.  5 sites sensed.      Skin integrity: Skin integrity normal.      Toenail Condition: Left toenails are normal.   Lymphadenopathy:      Cervical: No cervical adenopathy.   Skin:     General: Skin is warm and dry.   Neurological:      General: No focal deficit present.      Mental Status: He is alert and oriented to person, place, and time. Mental status is at baseline.      Motor: No weakness.      Coordination: Coordination normal.      Gait: Gait normal.   Psychiatric:         Mood and Affect: Mood normal.         Behavior: Behavior normal.         Thought Content: Thought content normal.         Judgment: Judgment normal.           Results     Lab Results   Component Value Date    WBC 8.26 01/04/2024    RBC 5.43 01/04/2024    HGB 15.2 01/04/2024    HCT 45.8 01/04/2024    MCV 84.3 01/04/2024    MCH 28.0 01/04/2024    MCHC 33.2 01/04/2024    RDW 12.8 01/04/2024     01/04/2024    MPV 10.1 01/04/2024     Sodium Level   Date Value Ref Range Status   01/04/2024 141 136 - 145 mmol/L Final     Potassium Level   Date Value Ref Range Status   01/04/2024 3.4 (L) 3.5 - 5.1 mmol/L Final     Carbon Dioxide   Date Value Ref  "Range Status   01/04/2024 25 22 - 29 mmol/L Final     Blood Urea Nitrogen   Date Value Ref Range Status   01/04/2024 12.6 8.4 - 25.7 mg/dL Final     Creatinine   Date Value Ref Range Status   01/04/2024 0.84 0.73 - 1.18 mg/dL Final     Calcium Level Total   Date Value Ref Range Status   01/04/2024 9.5 8.4 - 10.2 mg/dL Final     Albumin Level   Date Value Ref Range Status   01/04/2024 4.5 3.5 - 5.0 g/dL Final     Bilirubin Total   Date Value Ref Range Status   01/04/2024 0.6 <=1.5 mg/dL Final     Alkaline Phosphatase   Date Value Ref Range Status   01/04/2024 82 40 - 150 unit/L Final     Aspartate Aminotransferase   Date Value Ref Range Status   01/04/2024 34 5 - 34 unit/L Final     Alanine Aminotransferase   Date Value Ref Range Status   01/04/2024 47 0 - 55 unit/L Final     Estimated GFR-Non    Date Value Ref Range Status   06/10/2022 >60 mls/min/1.73/m2 Final     Lab Results   Component Value Date    CHOL 118 01/04/2024     Lab Results   Component Value Date    HDL 36 01/04/2024     No results found for: "LDLCALC"  Lab Results   Component Value Date    TRIG 214 (H) 01/04/2024     No results found for: "CHOLHDL"  Lab Results   Component Value Date    TSH 1.776 01/04/2024     Lab Results   Component Value Date    PHUR 6.5 07/30/2021    PROTEINUA 10 (A) 07/30/2021    GLUCUA 30 (A) 07/30/2021    KETONESU Negative 07/30/2021    OCCULTUA Negative 07/30/2021    NITRITE Negative 07/30/2021    LEUKOCYTESUR Negative 07/30/2021     Lab Results   Component Value Date    HGBA1C 8.0 (H) 01/04/2024    HGBA1C 9.2 (H) 08/24/2023    HGBA1C 8.4 (H) 05/19/2023     No results found for: "MICROALBUR", "[id number]"   Results for orders placed in visit on 12/15/22    Diabetic Eye Screening Photo         Assessment       ICD-10-CM ICD-9-CM   1. Primary hypertension  I10 401.9   2. Hypertension, unspecified type  I10 401.9   3. Type 2 diabetes mellitus without complication, without long-term current use of insulin  E11.9 " 250.00   4. Hyperlipidemia, unspecified hyperlipidemia type  E78.5 272.4   5. Type 2 diabetes mellitus without complication, with long-term current use of insulin  E11.9 250.00    Z79.4 V58.67       Plan       Problem List Items Addressed This Visit          Cardiac/Vascular    Hypertension    Current Assessment & Plan     BP Readings from Last 3 Encounters:   01/09/24 119/74   10/10/23 134/74   08/29/23 134/76   Follow low sodium diet, < 2 gm/day (avoid high salty foods such as processed meats/ sausage/oneal/ sandwich meat, chips, pickles, cheese, crackers and soft drinks/ electrolyte replacement drinks).  Avoid tobacco/ alcohol use  Educated on health benefits of at least 5 days/ week of 30 minutes moderate intensity exercise (brisk walking) and 2 or more days/ week of muscle strength activities  Daily ASA 81 mg for CV prevention  Continue current medication regimen           Relevant Medications    amLODIPine (NORVASC) 10 MG tablet    lisinopriL (PRINIVIL,ZESTRIL) 2.5 MG tablet       Endocrine    Type 2 diabetes mellitus    Current Assessment & Plan     A1c 8% improved from 9.2%  CBGs: 124 this morning  Hypoglycemia: denies  Microalbumin/ Cr ratio: 49.6 8/24/23  Educated on ADA diet:  1. Avoid/ decrease high carbohydrate foods (rice, pasta, bread, candy, sweets, carbonated beverages)  Educated on health benefits of at least 5 days/ week of 30 minutes moderate intensity exercise (brisk walking) and 2 or more days/ week of muscle strength activities  Avoid alcohol or tobacco use if applicable  Eye exam:  12/15/22 fundus photos with no diabetic retinopathy.  Fundus photos order today.  Foot exam: 1/9/24  ACEI/ ARB- lisinopril 2.5 mg once daily   Continue ASA 81 mg once daily and statin  Patient to increase glipizide b.i.d. as prescribed         Relevant Medications    aspirin (ECOTRIN) 81 MG EC tablet    glipiZIDE (GLUCOTROL) 5 MG tablet    insulin (BASAGLAR KWIKPEN U-100 INSULIN) glargine 100 units/mL SubQ pen     "lisinopriL (PRINIVIL,ZESTRIL) 2.5 MG tablet    metFORMIN (GLUCOPHAGE) 1000 MG tablet    rosuvastatin (CRESTOR) 40 MG Tab    SITagliptin phosphate (JANUVIA) 100 MG Tab    TECHLITE PEN NEEDLE 32 gauge x 5/32" Ndle    Other Relevant Orders    Diabetic Eye Screening Photo (Completed)    Hemoglobin A1C     Other Visit Diagnoses       Hyperlipidemia, unspecified hyperlipidemia type        Relevant Medications    rosuvastatin (CRESTOR) 40 MG Tab            Future Appointments   Date Time Provider Department Center   4/9/2024  7:20 AM Mariangel Coleman NP [facility]        Follow up in about 3 months (around 4/9/2024) for with fasting labs before visit.    Signature:  Mariangel Coleman NP  [facility]  [address] [facility] [address]    Date of encounter: 1/9/24  "

## 2024-01-09 NOTE — PROGRESS NOTES
Sinan Arreola is a 53 y.o. male here for a diabetic eye screening with non-dilated fundus photos per ANNMARIE Olivera.    Patient cooperative?: Yes  Small pupils?: No  Last eye exam: unknown    For exam results, see Encounter Report.

## 2024-01-10 ENCOUNTER — TELEPHONE (OUTPATIENT)
Dept: INTERNAL MEDICINE | Facility: CLINIC | Age: 54
End: 2024-01-10

## 2024-01-10 NOTE — TELEPHONE ENCOUNTER
Please let pt know diabetic eye results did not show any findings of retinopathy. It is recommended to repeat screening in a year.     Thank you

## 2024-01-11 NOTE — TELEPHONE ENCOUNTER
Called pt via  [facility]( #[phone]), no answer. Left voicemail to contact [facility].

## 2024-04-04 ENCOUNTER — APPOINTMENT (OUTPATIENT)
Dept: LAB | Facility: HOSPITAL | Age: 54
End: 2024-04-04
Attending: NURSE PRACTITIONER

## 2024-04-04 DIAGNOSIS — E11.9 TYPE 2 DIABETES MELLITUS WITHOUT COMPLICATION, WITHOUT LONG-TERM CURRENT USE OF INSULIN: ICD-10-CM

## 2024-04-04 LAB
EST. AVERAGE GLUCOSE BLD GHB EST-MCNC: 194.4 MG/DL
HBA1C MFR BLD: 8.4 %

## 2024-04-04 PROCEDURE — 83036 HEMOGLOBIN GLYCOSYLATED A1C: CPT

## 2024-04-04 PROCEDURE — 36415 COLL VENOUS BLD VENIPUNCTURE: CPT

## 2024-04-09 ENCOUNTER — OFFICE VISIT (OUTPATIENT)
Dept: INTERNAL MEDICINE | Facility: CLINIC | Age: 54
End: 2024-04-09

## 2024-04-09 VITALS
DIASTOLIC BLOOD PRESSURE: 73 MMHG | HEART RATE: 85 BPM | HEIGHT: 65 IN | SYSTOLIC BLOOD PRESSURE: 124 MMHG | WEIGHT: 200.63 LBS | RESPIRATION RATE: 20 BRPM | BODY MASS INDEX: 33.43 KG/M2 | TEMPERATURE: 98 F

## 2024-04-09 DIAGNOSIS — E11.9 TYPE 2 DIABETES MELLITUS WITHOUT COMPLICATION, WITH LONG-TERM CURRENT USE OF INSULIN: Primary | ICD-10-CM

## 2024-04-09 DIAGNOSIS — I10 PRIMARY HYPERTENSION: ICD-10-CM

## 2024-04-09 DIAGNOSIS — E66.9 CLASS 1 OBESITY WITH SERIOUS COMORBIDITY AND BODY MASS INDEX (BMI) OF 33.0 TO 33.9 IN ADULT, UNSPECIFIED OBESITY TYPE: ICD-10-CM

## 2024-04-09 DIAGNOSIS — E11.9 TYPE 2 DIABETES MELLITUS WITHOUT COMPLICATION, WITHOUT LONG-TERM CURRENT USE OF INSULIN: ICD-10-CM

## 2024-04-09 DIAGNOSIS — Z79.4 TYPE 2 DIABETES MELLITUS WITHOUT COMPLICATION, WITH LONG-TERM CURRENT USE OF INSULIN: Primary | ICD-10-CM

## 2024-04-09 PROCEDURE — 99214 OFFICE O/P EST MOD 30 MIN: CPT | Mod: PBBFAC | Performed by: NURSE PRACTITIONER

## 2024-04-09 PROCEDURE — 99214 OFFICE O/P EST MOD 30 MIN: CPT | Mod: S$PBB,,, | Performed by: NURSE PRACTITIONER

## 2024-04-09 RX ORDER — GLIPIZIDE 5 MG/1
10 TABLET ORAL 2 TIMES DAILY WITH MEALS
Qty: 360 TABLET | Refills: 1 | Status: SHIPPED | OUTPATIENT
Start: 2024-04-09

## 2024-04-09 NOTE — ASSESSMENT & PLAN NOTE
BP Readings from Last 3 Encounters:   04/09/24 124/73   01/09/24 119/74   10/10/23 134/74     Follow low sodium diet, < 2 gm/day (avoid high salty foods such as processed meats/ sausage/oneal/ sandwich meat, chips, pickles, cheese, crackers and soft drinks/ electrolyte replacement drinks).  Avoid tobacco/ alcohol use  Educated on health benefits of at least 5 days/ week of 30 minutes moderate intensity exercise (brisk walking) and 2 or more days/ week of muscle strength activities  Daily ASA 81 mg for CV prevention  Continue current medication regimen

## 2024-04-09 NOTE — PROGRESS NOTES
Mariangel L Virginia, NP   [facility]  [address] [facility] [address]      PATIENT NAME: Sinan Arreola  : 1970  DATE: 24  MRN: [id number]        History of Present Illness / Problem Focused Workflow     Sinan Arreola presents to the clinic with Results     HPI: 55 yo  male for routine follow up/ labs. PMhx includes BCC, left rib fracture, CP, tobacco abuse. Active diagnosis include type 2 DM, HTN, HLD, obesity, anterior listhesis lumbar disc. /73. A1c 8.4%. CBG this morning 121. Highest CBG 190s per pt. Compliant with medications as prescribed. Denies any other concerns/ complaints at this time.     Review of Systems     Review of Systems   Constitutional: Negative.    HENT: Negative.     Eyes: Negative.    Respiratory: Negative.     Cardiovascular: Negative.    Gastrointestinal: Negative.    Endocrine: Negative.    Genitourinary: Negative.    Musculoskeletal: Negative.    Skin: Negative.    Allergic/Immunologic: Negative.    Neurological: Negative.    Hematological: Negative.    Psychiatric/Behavioral: Negative.         Medical / Social / Family History     No past medical history on file.     Past Surgical History:   Procedure Laterality Date    CHOLECYSTECTOMY      SKIN CANCER EXCISION      head       Social History     Socioeconomic History    Marital status: Single   Tobacco Use    Smoking status: Never    Smokeless tobacco: Never   Substance and Sexual Activity    Alcohol use: Not Currently    Drug use: Never     Social Determinants of Health     Physical Activity: Inactive (2024)    Exercise Vital Sign     Days of Exercise per Week: 0 days     Minutes of Exercise per Session: 0 min        Family History   Family history unknown: Yes        Medications and Allergies     Medications  Current Outpatient Medications   Medication Instructions    amLODIPine (NORVASC) 10 mg, Oral, Daily  "   aspirin (ECOTRIN) 81 mg, Oral, Daily    glipiZIDE (GLUCOTROL) 10 mg, Oral, 2 times daily with meals    insulin (BASAGLAR KWIKPEN U-100 INSULIN) 45 Units, Subcutaneous, Nightly    lisinopriL (PRINIVIL,ZESTRIL) 2.5 mg, Oral, Daily    meloxicam (MOBIC) 15 mg, Oral, Daily, Take with food/ avoid other NSAIDs    metFORMIN (GLUCOPHAGE) 1,000 mg, Oral, 2 times daily with meals    omeprazole (PRILOSEC) 20 mg, Oral, Daily    rosuvastatin (CRESTOR) 40 mg, Oral, Nightly    SITagliptin phosphate (JANUVIA) 100 mg, Oral, Daily    TECHLITE PEN NEEDLE 32 gauge x 5/32" Ndle USE DAILY FOR INSULIN ADMINISTRATION    tiZANidine (ZANAFLEX) 4 mg, Oral, Every 8 hours PRN    triamcinolone acetonide 0.1% (KENALOG) 0.1 % cream Topical (Top), 2 times daily       Allergies  Review of patient's allergies indicates:  No Known Allergies    Physical Examination     Visit Vitals  /73 (BP Location: Left arm, Patient Position: Sitting, BP Method: X-Large (Automatic))   Pulse 85   Temp 98 °F (36.7 °C) (Oral)   Resp 20   Ht 5' 5" (1.651 m)   Wt 91 kg (200 lb 9.9 oz)   BMI 33.38 kg/m²       Physical Exam  Constitutional:       General: He is not in acute distress.     Appearance: Normal appearance. He is normal weight. He is not ill-appearing, toxic-appearing or diaphoretic.   HENT:      Head: Normocephalic.   Neck:      Thyroid: No thyroid mass, thyromegaly or thyroid tenderness.   Cardiovascular:      Rate and Rhythm: Normal rate and regular rhythm.      Heart sounds: Normal heart sounds.   Pulmonary:      Effort: Pulmonary effort is normal. No respiratory distress.      Breath sounds: Normal breath sounds. No stridor. No wheezing or rhonchi.   Lymphadenopathy:      Cervical: No cervical adenopathy.   Skin:     General: Skin is warm and dry.   Neurological:      General: No focal deficit present.      Mental Status: He is alert and oriented to person, place, and time. Mental status is at baseline.      Motor: No weakness.      Coordination: " "Coordination normal.      Gait: Gait normal.   Psychiatric:         Mood and Affect: Mood normal.         Behavior: Behavior normal.         Thought Content: Thought content normal.         Judgment: Judgment normal.           Results     Lab Results   Component Value Date    WBC 8.26 01/04/2024    RBC 5.43 01/04/2024    HGB 15.2 01/04/2024    HCT 45.8 01/04/2024    MCV 84.3 01/04/2024    MCH 28.0 01/04/2024    MCHC 33.2 01/04/2024    RDW 12.8 01/04/2024     01/04/2024    MPV 10.1 01/04/2024     Sodium Level   Date Value Ref Range Status   01/04/2024 141 136 - 145 mmol/L Final     Potassium Level   Date Value Ref Range Status   01/04/2024 3.4 (L) 3.5 - 5.1 mmol/L Final     Carbon Dioxide   Date Value Ref Range Status   01/04/2024 25 22 - 29 mmol/L Final     Blood Urea Nitrogen   Date Value Ref Range Status   01/04/2024 12.6 8.4 - 25.7 mg/dL Final     Creatinine   Date Value Ref Range Status   01/04/2024 0.84 0.73 - 1.18 mg/dL Final     Calcium Level Total   Date Value Ref Range Status   01/04/2024 9.5 8.4 - 10.2 mg/dL Final     Albumin Level   Date Value Ref Range Status   01/04/2024 4.5 3.5 - 5.0 g/dL Final     Bilirubin Total   Date Value Ref Range Status   01/04/2024 0.6 <=1.5 mg/dL Final     Alkaline Phosphatase   Date Value Ref Range Status   01/04/2024 82 40 - 150 unit/L Final     Aspartate Aminotransferase   Date Value Ref Range Status   01/04/2024 34 5 - 34 unit/L Final     Alanine Aminotransferase   Date Value Ref Range Status   01/04/2024 47 0 - 55 unit/L Final     Estimated GFR-Non    Date Value Ref Range Status   06/10/2022 >60 mls/min/1.73/m2 Final     Lab Results   Component Value Date    CHOL 118 01/04/2024     Lab Results   Component Value Date    HDL 36 01/04/2024     No results found for: "LDLCALC"  Lab Results   Component Value Date    TRIG 214 (H) 01/04/2024     No results found for: "CHOLHDL"  Lab Results   Component Value Date    TSH 1.776 01/04/2024     Lab Results " "  Component Value Date    PHUR 6.5 07/30/2021    PROTEINUA 10 (A) 07/30/2021    GLUCUA 30 (A) 07/30/2021    KETONESU Negative 07/30/2021    OCCULTUA Negative 07/30/2021    NITRITE Negative 07/30/2021    LEUKOCYTESUR Negative 07/30/2021     Lab Results   Component Value Date    HGBA1C 8.4 (H) 04/04/2024    HGBA1C 8.0 (H) 01/04/2024    HGBA1C 9.2 (H) 08/24/2023     No results found for: "MICROALBUR", "[id number]"   Results for orders placed in visit on 01/09/24    Diabetic Eye Screening Photo         Assessment       ICD-10-CM ICD-9-CM   1. Type 2 diabetes mellitus without complication, with long-term current use of insulin  E11.9 250.00    Z79.4 V58.67   2. Primary hypertension  I10 401.9   3. Class 1 obesity with serious comorbidity and body mass index (BMI) of 33.0 to 33.9 in adult, unspecified obesity type  E66.9 278.00    Z68.33 V85.33   4. Type 2 diabetes mellitus without complication, without long-term current use of insulin  E11.9 250.00       Plan       Problem List Items Addressed This Visit          Cardiac/Vascular    Hypertension    Current Assessment & Plan     BP Readings from Last 3 Encounters:   04/09/24 124/73   01/09/24 119/74   10/10/23 134/74   Follow low sodium diet, < 2 gm/day (avoid high salty foods such as processed meats/ sausage/oneal/ sandwich meat, chips, pickles, cheese, crackers and soft drinks/ electrolyte replacement drinks).  Avoid tobacco/ alcohol use  Educated on health benefits of at least 5 days/ week of 30 minutes moderate intensity exercise (brisk walking) and 2 or more days/ week of muscle strength activities  Daily ASA 81 mg for CV prevention  Continue current medication regimen           Relevant Orders    Comprehensive Metabolic Panel    Hemoglobin A1C    Microalbumin/Creatinine Ratio, Urine       Endocrine    Obesity    Current Assessment & Plan     BMI 33.38  Educated on increased risk of disease s/t obesity.  Educated on health benefits of at least 5 days/ week of 30 " minutes moderate intensity exercise (brisk walking) and 2 or more days/ week of muscle strength activities (as tolerated).  Eat well balanced diet of fresh fruits/ vegetables, whole grains, lean meats and limit high carbohydrate foods.            Type 2 diabetes mellitus - Primary    Current Assessment & Plan     A1c 8.4%  CBGs: 121 this morning  Hypoglycemia: denies  Microalbumin/ Cr ratio: 49.6 8/24/23  Educated on ADA diet:  1. Avoid/ decrease high carbohydrate foods (rice, pasta, bread, candy, sweets, carbonated beverages)  Educated on health benefits of at least 5 days/ week of 30 minutes moderate intensity exercise (brisk walking) and 2 or more days/ week of muscle strength activities  Avoid alcohol or tobacco use if applicable  Eye exam: 1/9/24 no DR   Foot exam: 1/9/24  ACEI/ ARB- lisinopril 2.5 mg once daily   Continue ASA 81 mg once daily and statin  Patient to increase glipizide 2 tablets b.i.d. as prescribed          Relevant Medications    glipiZIDE (GLUCOTROL) 5 MG tablet    Other Relevant Orders    Comprehensive Metabolic Panel    Hemoglobin A1C    Microalbumin/Creatinine Ratio, Urine       Future Appointments   Date Time Provider Department Center   7/11/2024  8:40 AM Mariangel Coleman NP [facility]        Follow up in about 3 months (around 7/9/2024) for with fasting labs before visit.    Signature:  Mariangel Coleman NP  [facility]  [address] [place] [address]    Date of encounter: 4/9/24

## 2024-04-09 NOTE — ASSESSMENT & PLAN NOTE
A1c 8.4%  CBGs: 121 this morning  Hypoglycemia: denies  Microalbumin/ Cr ratio: 49.6 8/24/23  Educated on ADA diet:  1. Avoid/ decrease high carbohydrate foods (rice, pasta, bread, candy, sweets, carbonated beverages)  Educated on health benefits of at least 5 days/ week of 30 minutes moderate intensity exercise (brisk walking) and 2 or more days/ week of muscle strength activities  Avoid alcohol or tobacco use if applicable  Eye exam: 1/9/24 no DR   Foot exam: 1/9/24  ACEI/ ARB- lisinopril 2.5 mg once daily   Continue ASA 81 mg once daily and statin  Patient to increase glipizide 2 tablets b.i.d. as prescribed

## 2024-04-09 NOTE — ASSESSMENT & PLAN NOTE
BMI 33.38  Educated on increased risk of disease s/t obesity.  Educated on health benefits of at least 5 days/ week of 30 minutes moderate intensity exercise (brisk walking) and 2 or more days/ week of muscle strength activities (as tolerated).  Eat well balanced diet of fresh fruits/ vegetables, whole grains, lean meats and limit high carbohydrate foods.

## 2024-05-13 ENCOUNTER — TELEPHONE (OUTPATIENT)
Dept: INTERNAL MEDICINE | Facility: CLINIC | Age: 54
End: 2024-05-13

## 2024-05-13 NOTE — TELEPHONE ENCOUNTER
Called and spoke to pt's daughter . Informed pt 's daughter pt has RX at Pharmacy with refill . Called and spoke to Vielka with [facility] . States they will fill new RX from 4/9/24 and confirmed pt has refills.

## 2024-05-13 NOTE — TELEPHONE ENCOUNTER
----- Message from Alisha Theodore sent at 5/13/2024 11:49 AM CDT -----  Regarding: refill request  .Type:  RX Refill Request    Who Called: pt daughter, sadiq    Refill or New Rx:refill    RX Name and Strength:glipiZIDE (GLUCOTROL) 5 MG tablet 360 tablet 1 4/9/2024 - No  Sig - Route: Take 2 tablets (10 mg total) by mouth 2 (two) times daily with meals.      How is the patient currently taking it? (ex. 1XDay):    Is this a 30 day or 90 day RX:    Preferred Pharmacy with phone number:[address]   Phone: [phone]        Local or Mail Order:local    Ordering Provider:ANNMARIE loaiza    Would the patient rather a call back or a response via [facility]?     Best Call Back Number:[phone]  Additional Information: refill request. Please advise.

## 2024-07-08 ENCOUNTER — LAB VISIT (OUTPATIENT)
Dept: LAB | Facility: HOSPITAL | Age: 54
End: 2024-07-08
Attending: NURSE PRACTITIONER

## 2024-07-08 DIAGNOSIS — E11.9 TYPE 2 DIABETES MELLITUS WITHOUT COMPLICATION, WITH LONG-TERM CURRENT USE OF INSULIN: ICD-10-CM

## 2024-07-08 DIAGNOSIS — Z79.4 TYPE 2 DIABETES MELLITUS WITHOUT COMPLICATION, WITH LONG-TERM CURRENT USE OF INSULIN: ICD-10-CM

## 2024-07-08 DIAGNOSIS — I10 PRIMARY HYPERTENSION: ICD-10-CM

## 2024-07-08 LAB
ALBUMIN SERPL-MCNC: 4.4 G/DL (ref 3.5–5)
ALBUMIN/GLOB SERPL: 1.3 RATIO (ref 1.1–2)
ALP SERPL-CCNC: 73 UNIT/L (ref 40–150)
ALT SERPL-CCNC: 41 UNIT/L (ref 0–55)
ANION GAP SERPL CALC-SCNC: 11 MEQ/L
AST SERPL-CCNC: 43 UNIT/L (ref 5–34)
BILIRUB SERPL-MCNC: 0.6 MG/DL
BUN SERPL-MCNC: 19.9 MG/DL (ref 8.4–25.7)
CALCIUM SERPL-MCNC: 10.1 MG/DL (ref 8.4–10.2)
CHLORIDE SERPL-SCNC: 106 MMOL/L (ref 98–107)
CO2 SERPL-SCNC: 22 MMOL/L (ref 22–29)
CREAT SERPL-MCNC: 0.93 MG/DL (ref 0.73–1.18)
CREAT UR-MCNC: 180 MG/DL (ref 63–166)
CREAT/UREA NIT SERPL: 21
EST. AVERAGE GLUCOSE BLD GHB EST-MCNC: 203 MG/DL
GFR SERPLBLD CREATININE-BSD FMLA CKD-EPI: >60 ML/MIN/1.73/M2
GLOBULIN SER-MCNC: 3.4 GM/DL (ref 2.4–3.5)
GLUCOSE SERPL-MCNC: 141 MG/DL (ref 74–100)
HBA1C MFR BLD: 8.7 %
MICROALBUMIN UR-MCNC: 83.7 UG/ML
MICROALBUMIN/CREAT RATIO PNL UR: 46.5 MG/GM CR (ref 0–30)
POTASSIUM SERPL-SCNC: 3.9 MMOL/L (ref 3.5–5.1)
PROT SERPL-MCNC: 7.8 GM/DL (ref 6.4–8.3)
SODIUM SERPL-SCNC: 139 MMOL/L (ref 136–145)

## 2024-07-08 PROCEDURE — 82043 UR ALBUMIN QUANTITATIVE: CPT

## 2024-07-08 PROCEDURE — 83036 HEMOGLOBIN GLYCOSYLATED A1C: CPT

## 2024-07-08 PROCEDURE — 36415 COLL VENOUS BLD VENIPUNCTURE: CPT

## 2024-07-08 PROCEDURE — 80053 COMPREHEN METABOLIC PANEL: CPT

## 2024-07-11 ENCOUNTER — OFFICE VISIT (OUTPATIENT)
Dept: INTERNAL MEDICINE | Facility: CLINIC | Age: 54
End: 2024-07-11

## 2024-07-11 VITALS
BODY MASS INDEX: 32.85 KG/M2 | SYSTOLIC BLOOD PRESSURE: 123 MMHG | OXYGEN SATURATION: 97 % | HEIGHT: 65 IN | TEMPERATURE: 98 F | HEART RATE: 89 BPM | WEIGHT: 197.19 LBS | DIASTOLIC BLOOD PRESSURE: 66 MMHG

## 2024-07-11 DIAGNOSIS — E78.5 HYPERLIPIDEMIA, UNSPECIFIED HYPERLIPIDEMIA TYPE: ICD-10-CM

## 2024-07-11 DIAGNOSIS — I10 PRIMARY HYPERTENSION: ICD-10-CM

## 2024-07-11 DIAGNOSIS — E11.9 TYPE 2 DIABETES MELLITUS WITHOUT COMPLICATION, WITH LONG-TERM CURRENT USE OF INSULIN: ICD-10-CM

## 2024-07-11 DIAGNOSIS — E11.9 TYPE 2 DIABETES MELLITUS WITHOUT COMPLICATION, WITHOUT LONG-TERM CURRENT USE OF INSULIN: ICD-10-CM

## 2024-07-11 DIAGNOSIS — M62.838 CERVICAL PARASPINAL MUSCLE SPASM: Primary | ICD-10-CM

## 2024-07-11 DIAGNOSIS — Z79.4 TYPE 2 DIABETES MELLITUS WITHOUT COMPLICATION, WITH LONG-TERM CURRENT USE OF INSULIN: ICD-10-CM

## 2024-07-11 DIAGNOSIS — M47.812 CERVICAL SPONDYLOSIS: ICD-10-CM

## 2024-07-11 DIAGNOSIS — Z12.5 PROSTATE CANCER SCREENING: ICD-10-CM

## 2024-07-11 PROCEDURE — 99214 OFFICE O/P EST MOD 30 MIN: CPT | Mod: PBBFAC | Performed by: NURSE PRACTITIONER

## 2024-07-11 RX ORDER — LISINOPRIL 2.5 MG/1
2.5 TABLET ORAL DAILY
Qty: 90 TABLET | Refills: 2 | Status: SHIPPED | OUTPATIENT
Start: 2024-07-11

## 2024-07-11 RX ORDER — OMEPRAZOLE 20 MG/1
20 CAPSULE, DELAYED RELEASE ORAL DAILY
Qty: 90 CAPSULE | Refills: 2 | Status: SHIPPED | OUTPATIENT
Start: 2024-07-11

## 2024-07-11 RX ORDER — GLIPIZIDE 5 MG/1
10 TABLET ORAL 2 TIMES DAILY WITH MEALS
Qty: 360 TABLET | Refills: 1 | Status: SHIPPED | OUTPATIENT
Start: 2024-07-11

## 2024-07-11 RX ORDER — ASPIRIN 81 MG/1
81 TABLET ORAL DAILY
Qty: 90 TABLET | Refills: 2 | Status: SHIPPED | OUTPATIENT
Start: 2024-07-11

## 2024-07-11 RX ORDER — INSULIN GLARGINE 100 [IU]/ML
50 INJECTION, SOLUTION SUBCUTANEOUS NIGHTLY
Qty: 15 ML | Refills: 6 | Status: SHIPPED | OUTPATIENT
Start: 2024-07-11

## 2024-07-11 RX ORDER — AMLODIPINE BESYLATE 10 MG/1
10 TABLET ORAL DAILY
Qty: 90 TABLET | Refills: 2 | Status: SHIPPED | OUTPATIENT
Start: 2024-07-11

## 2024-07-11 RX ORDER — METFORMIN HYDROCHLORIDE 1000 MG/1
1000 TABLET ORAL 2 TIMES DAILY WITH MEALS
Qty: 180 TABLET | Refills: 2 | Status: SHIPPED | OUTPATIENT
Start: 2024-07-11

## 2024-07-11 RX ORDER — ROSUVASTATIN CALCIUM 40 MG/1
40 TABLET, COATED ORAL NIGHTLY
Qty: 90 TABLET | Refills: 2 | Status: SHIPPED | OUTPATIENT
Start: 2024-07-11

## 2024-07-11 RX ORDER — METHOCARBAMOL 500 MG/1
500 TABLET, FILM COATED ORAL
Qty: 30 TABLET | Refills: 1 | Status: SHIPPED | OUTPATIENT
Start: 2024-07-11

## 2024-07-11 RX ORDER — DICLOFENAC SODIUM 75 MG/1
75 TABLET, DELAYED RELEASE ORAL 2 TIMES DAILY PRN
Qty: 20 TABLET | Refills: 1 | Status: SHIPPED | OUTPATIENT
Start: 2024-07-11

## 2024-07-11 NOTE — PROGRESS NOTES
Mariangel L Virginia, NP   [facility]  [address] [facility] [address]      PATIENT NAME: Sinan Arreola  : 1970  DATE: 24  MRN: [id number]        History of Present Illness / Problem Focused Workflow     Sinan Arreola presents to the clinic with Follow-up and Labs Only (3 month f/u with fasting labs)     HPI: 55 yo  male for 3 mo follow up/ lab results.PMhx includes BCC, left rib fracture, CP, tobacco abuse. Active diagnosis include type 2 DM, HTN, HLD, obesity, anterior listhesis lumbar disc. C/o left sided neck pain after lifting his wife in bed onset x 2 days.  Hurts to move neck to the left side.  Denies any radiculopathy to left arm, any numbness or tingling.  Labs completed. A1c mildly worse 8.7%.  He states he had not been taking medications regularly due to caring for his wife and cost.  He has restarted them over the last 2 days.  No other concerns stated.    Review of Systems     Review of Systems   Constitutional: Negative.    HENT: Negative.     Eyes: Negative.    Respiratory: Negative.     Cardiovascular: Negative.    Gastrointestinal: Negative.    Endocrine: Negative.    Genitourinary: Negative.    Musculoskeletal:  Positive for neck pain.   Skin: Negative.    Allergic/Immunologic: Negative.    Neurological: Negative.    Hematological: Negative.    Psychiatric/Behavioral: Negative.         Medical / Social / Family History     No past medical history on file.     Past Surgical History:   Procedure Laterality Date    CHOLECYSTECTOMY      SKIN CANCER EXCISION      head       Social History     Socioeconomic History    Marital status: Single   Tobacco Use    Smoking status: Never    Smokeless tobacco: Never   Substance and Sexual Activity    Alcohol use: Not Currently    Drug use: Never     Social Determinants of Health     Physical Activity: Inactive (2024)    Exercise Vital Sign     " Days of Exercise per Week: 0 days     Minutes of Exercise per Session: 0 min        Family History   Family history unknown: Yes        Medications and Allergies     Medications  Current Outpatient Medications   Medication Instructions    amLODIPine (NORVASC) 10 mg, Oral, Daily    aspirin (ECOTRIN) 81 mg, Oral, Daily    BASAGLAR KWIKPEN U-100 INSULIN 50 Units, Subcutaneous, Nightly    diclofenac (VOLTAREN) 75 mg, Oral, 2 times daily PRN, Take with food/ milk. Avoid other NSAIDs.    glipiZIDE (GLUCOTROL) 10 mg, Oral, 2 times daily with meals    lisinopriL (PRINIVIL,ZESTRIL) 2.5 mg, Oral, Daily    metFORMIN (GLUCOPHAGE) 1,000 mg, Oral, 2 times daily with meals    methocarbamoL (ROBAXIN) 500 mg, Oral, 3 times daily with meals PRN, May cause drowsiness. Do not drive / drink alcohol.    omeprazole (PRILOSEC) 20 mg, Oral, Daily    rosuvastatin (CRESTOR) 40 mg, Oral, Nightly    SITagliptin phosphate (JANUVIA) 100 mg, Oral, Daily    TECHLITE PEN NEEDLE 32 gauge x 5/32" Ndle USE DAILY FOR INSULIN ADMINISTRATION    triamcinolone acetonide 0.1% (KENALOG) 0.1 % cream Topical (Top), 2 times daily       Allergies  Review of patient's allergies indicates:  No Known Allergies    Physical Examination     Visit Vitals  /66 (BP Location: Left arm, Patient Position: Sitting, BP Method: Medium (Automatic))   Pulse 89   Temp 98.2 °F (36.8 °C) (Oral)   Ht 5' 5" (1.651 m)   Wt 89.4 kg (197 lb 3.2 oz)   SpO2 97%   BMI 32.82 kg/m²       Physical Exam  Constitutional:       General: He is not in acute distress.     Appearance: Normal appearance. He is normal weight. He is not ill-appearing, toxic-appearing or diaphoretic.   HENT:      Head: Normocephalic.   Cardiovascular:      Rate and Rhythm: Normal rate and regular rhythm.      Heart sounds: Normal heart sounds. No murmur heard.  Pulmonary:      Effort: Pulmonary effort is normal. No respiratory distress.      Breath sounds: Normal breath sounds. No stridor. No wheezing, rhonchi or " rales.   Musculoskeletal:      Cervical back: Spasms and tenderness present. Decreased range of motion.   Skin:     General: Skin is warm and dry.   Neurological:      General: No focal deficit present.      Mental Status: He is alert and oriented to person, place, and time. Mental status is at baseline.      Motor: No weakness.      Coordination: Coordination normal.      Gait: Gait normal.   Psychiatric:         Mood and Affect: Mood normal.         Behavior: Behavior normal.         Thought Content: Thought content normal.         Judgment: Judgment normal.           Results     Lab Results   Component Value Date    WBC 8.26 01/04/2024    RBC 5.43 01/04/2024    HGB 15.2 01/04/2024    HCT 45.8 01/04/2024    MCV 84.3 01/04/2024    MCH 28.0 01/04/2024    MCHC 33.2 01/04/2024    RDW 12.8 01/04/2024     01/04/2024    MPV 10.1 01/04/2024     Sodium   Date Value Ref Range Status   07/08/2024 139 136 - 145 mmol/L Final     Potassium   Date Value Ref Range Status   07/08/2024 3.9 3.5 - 5.1 mmol/L Final     Chloride   Date Value Ref Range Status   07/08/2024 106 98 - 107 mmol/L Final     CO2   Date Value Ref Range Status   07/08/2024 22 22 - 29 mmol/L Final     Blood Urea Nitrogen   Date Value Ref Range Status   07/08/2024 19.9 8.4 - 25.7 mg/dL Final     Creatinine   Date Value Ref Range Status   07/08/2024 0.93 0.73 - 1.18 mg/dL Final     Calcium   Date Value Ref Range Status   07/08/2024 10.1 8.4 - 10.2 mg/dL Final     Albumin   Date Value Ref Range Status   07/08/2024 4.4 3.5 - 5.0 g/dL Final     Bilirubin Total   Date Value Ref Range Status   07/08/2024 0.6 <=1.5 mg/dL Final     ALP   Date Value Ref Range Status   07/08/2024 73 40 - 150 unit/L Final     AST   Date Value Ref Range Status   07/08/2024 43 (H) 5 - 34 unit/L Final     ALT   Date Value Ref Range Status   07/08/2024 41 0 - 55 unit/L Final     Estimated GFR-Non    Date Value Ref Range Status   06/10/2022 >60 mls/min/1.73/m2 Final     Lab  Results   Component Value Date    CHOL 118 01/04/2024     Lab Results   Component Value Date    HDL 36 01/04/2024     Lab Results   Component Value Date    LDL 39.00 (L) 01/04/2024       Lab Results   Component Value Date    TRIG 214 (H) 01/04/2024     Lab Results   Component Value Date    TSH 1.776 01/04/2024     Lab Results   Component Value Date    PHUR 6.5 07/30/2021    PROTEINUA 10 (A) 07/30/2021    GLUCUA 30 (A) 07/30/2021    KETONESU Negative 07/30/2021    OCCULTUA Negative 07/30/2021    NITRITE Negative 07/30/2021    LEUKOCYTESUR Negative 07/30/2021     Lab Results   Component Value Date    HGBA1C 8.7 (H) 07/08/2024    HGBA1C 8.4 (H) 04/04/2024    HGBA1C 8.0 (H) 01/04/2024     Lab Results   Component Value Date    MICALBCREAT 46.5 (H) 07/08/2024       Results for orders placed in visit on 01/09/24    Diabetic Eye Screening Photo         Assessment       ICD-10-CM ICD-9-CM   1. Cervical paraspinal muscle spasm  M62.838 728.85   2. Cervical spondylosis  M47.812 721.0   3. Type 2 diabetes mellitus without complication, with long-term current use of insulin  E11.9 250.00    Z79.4 V58.67   4. Primary hypertension  I10 401.9   5. Type 2 diabetes mellitus without complication, without long-term current use of insulin  E11.9 250.00   6. Hyperlipidemia, unspecified hyperlipidemia type  E78.5 272.4   7. Prostate cancer screening  Z12.5 V76.44       Plan       Problem List Items Addressed This Visit          Neuro    Cervical spondylosis    Overview     XR 8/2023  FINDINGS:  Vertebral body heights are maintained without appreciable fracture. Normal alignment.  C7 obscured by the shoulders on the lateral view, better visualized on the swimmer's view.     Mild disc space narrowing at C5-C6 with anterior disc osteophyte, similar to previous.     Prevertebral soft tissues are unremarkable.     Impression:     Significant change from previous exam.  Mild degenerative change at lower cervical spine.         Relevant  Medications    methocarbamoL (ROBAXIN) 500 MG Tab    diclofenac (VOLTAREN) 75 MG EC tablet       Cardiac/Vascular    Hypertension    Overview     Current medication amlodipine 10 mg, lisinopril 2.5 mg         Current Assessment & Plan     BP Readings from Last 3 Encounters:   07/11/24 123/66   04/09/24 124/73   01/09/24 119/74     Follow low sodium diet, < 2 gm/day (avoid high salty foods such as processed meats/ sausage/oneal/ sandwich meat, chips, pickles, cheese, crackers and soft drinks/ electrolyte replacement drinks).  Avoid tobacco/ alcohol use  Educated on health benefits of at least 5 days/ week of 30 minutes moderate intensity exercise (brisk walking) and 2 or more days/ week of muscle strength activities  Daily ASA 81 mg for CV prevention  Continue current medication regimen           Relevant Medications    amLODIPine (NORVASC) 10 MG tablet    lisinopriL (PRINIVIL,ZESTRIL) 2.5 MG tablet    Other Relevant Orders    Hemoglobin A1C    Comprehensive Metabolic Panel    Microalbumin/Creatinine Ratio, Urine       Endocrine    Type 2 diabetes mellitus    Current Assessment & Plan     Lab Results   Component Value Date    HGBA1C 8.7 (H) 07/08/2024   CBGs: 121 this morning  Hypoglycemia: denies  Microalbumin/ Cr ratio:   Lab Results   Component Value Date    MICALBCREAT 46.5 (H) 07/08/2024       Educated on ADA diet:  1. Avoid/ decrease high carbohydrate foods (rice, pasta, bread, candy, sweets, carbonated beverages)  Educated on health benefits of at least 5 days/ week of 30 minutes moderate intensity exercise (brisk walking) and 2 or more days/ week of muscle strength activities  Avoid alcohol or tobacco use if applicable  Eye exam: 1/9/24 no DR   Foot exam: 1/9/24  Continue lisinopril 2.5 mg, rosuvastatin 40 mg and daily aspirin 81 mg  Patient reports some non compliance intermittently with medications due to caring for his wife and cost reasons. He started taking regularly over the last few days.   Increase  Basaglar to 50 units and continue remaining medications: Glipizide 10 mg BID, Metformin 1000 mg BID, Januvia 100 mg  F/u 3 mo   Patient would benefit from Farxiga and will look into cost at pharmacy as pt does not have insurance assistance         Relevant Medications    aspirin (ECOTRIN) 81 MG EC tablet    insulin glargine U-100, Lantus, (BASAGLAR KWIKPEN U-100 INSULIN) 100 unit/mL (3 mL) InPn pen    lisinopriL (PRINIVIL,ZESTRIL) 2.5 MG tablet    metFORMIN (GLUCOPHAGE) 1000 MG tablet    rosuvastatin (CRESTOR) 40 MG Tab    SITagliptin phosphate (JANUVIA) 100 MG Tab    glipiZIDE (GLUCOTROL) 5 MG tablet    Other Relevant Orders    Hemoglobin A1C    Comprehensive Metabolic Panel    Microalbumin/Creatinine Ratio, Urine       Orthopedic    Cervical paraspinal muscle spasm - Primary    Overview     See cervical spondylosis         Current Assessment & Plan     C/o 2 day onset of left sided neck pain after lifting his wife in bed. He has pain to turn head to left side. Denies radiculopathy symptoms. DC Meloxicam and Tizanidine. New prescription sent for Diclofenac 75 mg BID prn pain and Methocarbamol 500 mg TID prn muscle pain. Patient may benefit from further imaging but he would like to wait about a week to see if symptoms improve before ordering imaging         Relevant Medications    methocarbamoL (ROBAXIN) 500 MG Tab    diclofenac (VOLTAREN) 75 MG EC tablet    Other Relevant Orders    Hemoglobin A1C    Comprehensive Metabolic Panel    Microalbumin/Creatinine Ratio, Urine     Other Visit Diagnoses       Hyperlipidemia, unspecified hyperlipidemia type        Relevant Medications    rosuvastatin (CRESTOR) 40 MG Tab    Prostate cancer screening        Relevant Orders    PSA, Screening            Future Appointments   Date Time Provider Department Center   10/14/2024  2:00 PM [facility]        Follow up in about 3 months (around 10/11/2024) for with fasting labs before  visit.    Signature:  Mariangel Coleman NP  [facility]  [address] [facility] [address]    Date of encounter: 7/11/24

## 2024-07-11 NOTE — ASSESSMENT & PLAN NOTE
C/o 2 day onset of left sided neck pain after lifting his wife in bed. He has pain to turn head to left side. Denies radiculopathy symptoms. DC Meloxicam and Tizanidine. New prescription sent for Diclofenac 75 mg BID prn pain and Methocarbamol 500 mg TID prn muscle pain. Patient may benefit from further imaging but he would like to wait about a week to see if symptoms improve before ordering imaging

## 2024-07-11 NOTE — ASSESSMENT & PLAN NOTE
Lab Results   Component Value Date    HGBA1C 8.7 (H) 07/08/2024   CBGs: 121 this morning  Hypoglycemia: denies  Microalbumin/ Cr ratio:   Lab Results   Component Value Date    MICALBCREAT 46.5 (H) 07/08/2024       Educated on ADA diet:  1. Avoid/ decrease high carbohydrate foods (rice, pasta, bread, candy, sweets, carbonated beverages)  Educated on health benefits of at least 5 days/ week of 30 minutes moderate intensity exercise (brisk walking) and 2 or more days/ week of muscle strength activities  Avoid alcohol or tobacco use if applicable  Eye exam: 1/9/24 no DR   Foot exam: 1/9/24  Continue lisinopril 2.5 mg, rosuvastatin 40 mg and daily aspirin 81 mg  Patient reports some non compliance intermittently with medications due to caring for his wife and cost reasons. He started taking regularly over the last few days.   Increase Basaglar to 50 units and continue remaining medications: Glipizide 10 mg BID, Metformin 1000 mg BID, Januvia 100 mg  F/u 3 mo   Patient would benefit from Farxiga and will look into cost at pharmacy as pt does not have insurance assistance

## 2024-07-11 NOTE — ASSESSMENT & PLAN NOTE
BP Readings from Last 3 Encounters:   07/11/24 123/66   04/09/24 124/73   01/09/24 119/74     Follow low sodium diet, < 2 gm/day (avoid high salty foods such as processed meats/ sausage/oneal/ sandwich meat, chips, pickles, cheese, crackers and soft drinks/ electrolyte replacement drinks).  Avoid tobacco/ alcohol use  Educated on health benefits of at least 5 days/ week of 30 minutes moderate intensity exercise (brisk walking) and 2 or more days/ week of muscle strength activities  Daily ASA 81 mg for CV prevention  Continue current medication regimen

## 2024-09-04 DIAGNOSIS — M43.16 ANTEROLISTHESIS OF LUMBAR SPINE: ICD-10-CM

## 2024-09-05 RX ORDER — BACLOFEN 10 MG/1
TABLET ORAL
Qty: 60 TABLET | Refills: 1 | OUTPATIENT
Start: 2024-09-05

## 2024-10-10 ENCOUNTER — LAB VISIT (OUTPATIENT)
Dept: LAB | Facility: HOSPITAL | Age: 54
End: 2024-10-10
Attending: NURSE PRACTITIONER

## 2024-10-10 DIAGNOSIS — I10 PRIMARY HYPERTENSION: ICD-10-CM

## 2024-10-10 DIAGNOSIS — E11.9 TYPE 2 DIABETES MELLITUS WITHOUT COMPLICATION, WITH LONG-TERM CURRENT USE OF INSULIN: ICD-10-CM

## 2024-10-10 DIAGNOSIS — Z79.4 TYPE 2 DIABETES MELLITUS WITHOUT COMPLICATION, WITH LONG-TERM CURRENT USE OF INSULIN: ICD-10-CM

## 2024-10-10 DIAGNOSIS — Z12.5 PROSTATE CANCER SCREENING: ICD-10-CM

## 2024-10-10 DIAGNOSIS — M62.838 CERVICAL PARASPINAL MUSCLE SPASM: ICD-10-CM

## 2024-10-10 LAB
ALBUMIN SERPL-MCNC: 4.3 G/DL (ref 3.5–5)
ALBUMIN/GLOB SERPL: 1.2 RATIO (ref 1.1–2)
ALP SERPL-CCNC: 75 UNIT/L (ref 40–150)
ALT SERPL-CCNC: 46 UNIT/L (ref 0–55)
ANION GAP SERPL CALC-SCNC: 9 MEQ/L
AST SERPL-CCNC: 32 UNIT/L (ref 5–34)
BILIRUB SERPL-MCNC: 0.6 MG/DL
BUN SERPL-MCNC: 13 MG/DL (ref 8.4–25.7)
CALCIUM SERPL-MCNC: 10.2 MG/DL (ref 8.4–10.2)
CHLORIDE SERPL-SCNC: 105 MMOL/L (ref 98–107)
CO2 SERPL-SCNC: 26 MMOL/L (ref 22–29)
CREAT SERPL-MCNC: 0.84 MG/DL (ref 0.73–1.18)
CREAT UR-MCNC: 103.8 MG/DL (ref 63–166)
CREAT/UREA NIT SERPL: 15
EST. AVERAGE GLUCOSE BLD GHB EST-MCNC: 194.4 MG/DL
GFR SERPLBLD CREATININE-BSD FMLA CKD-EPI: >60 ML/MIN/1.73/M2
GLOBULIN SER-MCNC: 3.6 GM/DL (ref 2.4–3.5)
GLUCOSE SERPL-MCNC: 127 MG/DL (ref 74–100)
HBA1C MFR BLD: 8.4 %
MICROALBUMIN UR-MCNC: 36 UG/ML
MICROALBUMIN/CREAT RATIO PNL UR: 34.7 MG/GM CR (ref 0–30)
POTASSIUM SERPL-SCNC: 4.1 MMOL/L (ref 3.5–5.1)
PROT SERPL-MCNC: 7.9 GM/DL (ref 6.4–8.3)
PSA SERPL-MCNC: 0.64 NG/ML
SODIUM SERPL-SCNC: 140 MMOL/L (ref 136–145)

## 2024-10-10 PROCEDURE — 83036 HEMOGLOBIN GLYCOSYLATED A1C: CPT

## 2024-10-10 PROCEDURE — 82570 ASSAY OF URINE CREATININE: CPT

## 2024-10-10 PROCEDURE — 80053 COMPREHEN METABOLIC PANEL: CPT

## 2024-10-10 PROCEDURE — 84153 ASSAY OF PSA TOTAL: CPT

## 2024-10-10 PROCEDURE — 36415 COLL VENOUS BLD VENIPUNCTURE: CPT

## 2024-10-10 PROCEDURE — 82043 UR ALBUMIN QUANTITATIVE: CPT

## 2024-10-14 ENCOUNTER — HOSPITAL ENCOUNTER (EMERGENCY)
Facility: HOSPITAL | Age: 54
Discharge: HOME | End: 2024-10-14
Attending: INTERNAL MEDICINE

## 2024-10-14 VITALS
WEIGHT: 199.75 LBS | SYSTOLIC BLOOD PRESSURE: 160 MMHG | HEIGHT: 66 IN | HEART RATE: 90 BPM | BODY MASS INDEX: 32.1 KG/M2 | RESPIRATION RATE: 14 BRPM | DIASTOLIC BLOOD PRESSURE: 90 MMHG | TEMPERATURE: 98 F | OXYGEN SATURATION: 97 %

## 2024-10-14 DIAGNOSIS — M62.838 MUSCLE SPASM: ICD-10-CM

## 2024-10-14 DIAGNOSIS — R07.9 CHEST PAIN: ICD-10-CM

## 2024-10-14 DIAGNOSIS — M47.812 CERVICAL SPONDYLOSIS: ICD-10-CM

## 2024-10-14 DIAGNOSIS — M62.838 CERVICAL PARASPINAL MUSCLE SPASM: ICD-10-CM

## 2024-10-14 DIAGNOSIS — M79.18 RHOMBOID MUSCLE PAIN: Primary | ICD-10-CM

## 2024-10-14 LAB
ALBUMIN SERPL-MCNC: 4.4 G/DL (ref 3.5–5)
ALBUMIN/GLOB SERPL: 1.3 RATIO (ref 1.1–2)
ALP SERPL-CCNC: 86 UNIT/L (ref 40–150)
ALT SERPL-CCNC: 52 UNIT/L (ref 0–55)
ANION GAP SERPL CALC-SCNC: 11 MEQ/L
AST SERPL-CCNC: 37 UNIT/L (ref 5–34)
BASOPHILS # BLD AUTO: 0.02 X10(3)/MCL
BASOPHILS NFR BLD AUTO: 0.2 %
BILIRUB SERPL-MCNC: 0.5 MG/DL
BNP BLD-MCNC: <10 PG/ML
BUN SERPL-MCNC: 12.8 MG/DL (ref 8.4–25.7)
CALCIUM SERPL-MCNC: 10.1 MG/DL (ref 8.4–10.2)
CHLORIDE SERPL-SCNC: 106 MMOL/L (ref 98–107)
CO2 SERPL-SCNC: 24 MMOL/L (ref 22–29)
CREAT SERPL-MCNC: 0.9 MG/DL (ref 0.73–1.18)
CREAT/UREA NIT SERPL: 14
D DIMER PPP IA.FEU-MCNC: <0.27 UG/ML FEU (ref 0–0.5)
EOSINOPHIL # BLD AUTO: 0.2 X10(3)/MCL (ref 0–0.9)
EOSINOPHIL NFR BLD AUTO: 2.1 %
ERYTHROCYTE [DISTWIDTH] IN BLOOD BY AUTOMATED COUNT: 12.8 % (ref 11.5–17)
GFR SERPLBLD CREATININE-BSD FMLA CKD-EPI: >60 ML/MIN/1.73/M2
GLOBULIN SER-MCNC: 3.5 GM/DL (ref 2.4–3.5)
GLUCOSE SERPL-MCNC: 136 MG/DL (ref 74–100)
HCT VFR BLD AUTO: 44.1 % (ref 42–52)
HGB BLD-MCNC: 15 G/DL (ref 14–18)
HOLD SPECIMEN: NORMAL
IMM GRANULOCYTES # BLD AUTO: 0.02 X10(3)/MCL (ref 0–0.04)
IMM GRANULOCYTES NFR BLD AUTO: 0.2 %
LIPASE SERPL-CCNC: 27 U/L
LYMPHOCYTES # BLD AUTO: 2.41 X10(3)/MCL (ref 0.6–4.6)
LYMPHOCYTES NFR BLD AUTO: 25.3 %
MCH RBC QN AUTO: 29.1 PG (ref 27–31)
MCHC RBC AUTO-ENTMCNC: 34 G/DL (ref 33–36)
MCV RBC AUTO: 85.6 FL (ref 80–94)
MONOCYTES # BLD AUTO: 0.66 X10(3)/MCL (ref 0.1–1.3)
MONOCYTES NFR BLD AUTO: 6.9 %
NEUTROPHILS # BLD AUTO: 6.2 X10(3)/MCL (ref 2.1–9.2)
NEUTROPHILS NFR BLD AUTO: 65.3 %
NRBC BLD AUTO-RTO: 0 %
PLATELET # BLD AUTO: 202 X10(3)/MCL (ref 130–400)
PMV BLD AUTO: 10.3 FL (ref 7.4–10.4)
POTASSIUM SERPL-SCNC: 3.9 MMOL/L (ref 3.5–5.1)
PROT SERPL-MCNC: 7.9 GM/DL (ref 6.4–8.3)
RBC # BLD AUTO: 5.15 X10(6)/MCL (ref 4.7–6.1)
SODIUM SERPL-SCNC: 141 MMOL/L (ref 136–145)
TROPONIN I SERPL-MCNC: <0.01 NG/ML (ref 0–0.04)
WBC # BLD AUTO: 9.51 X10(3)/MCL (ref 4.5–11.5)

## 2024-10-14 PROCEDURE — 83880 ASSAY OF NATRIURETIC PEPTIDE: CPT | Performed by: NURSE PRACTITIONER

## 2024-10-14 PROCEDURE — 96372 THER/PROPH/DIAG INJ SC/IM: CPT | Performed by: NURSE PRACTITIONER

## 2024-10-14 PROCEDURE — 85379 FIBRIN DEGRADATION QUANT: CPT | Performed by: NURSE PRACTITIONER

## 2024-10-14 PROCEDURE — 84484 ASSAY OF TROPONIN QUANT: CPT | Performed by: NURSE PRACTITIONER

## 2024-10-14 PROCEDURE — 85025 COMPLETE CBC W/AUTO DIFF WBC: CPT | Performed by: NURSE PRACTITIONER

## 2024-10-14 PROCEDURE — 83690 ASSAY OF LIPASE: CPT | Performed by: NURSE PRACTITIONER

## 2024-10-14 PROCEDURE — 80053 COMPREHEN METABOLIC PANEL: CPT | Performed by: NURSE PRACTITIONER

## 2024-10-14 PROCEDURE — 99285 EMERGENCY DEPT VISIT HI MDM: CPT | Mod: 25

## 2024-10-14 PROCEDURE — 93005 ELECTROCARDIOGRAM TRACING: CPT

## 2024-10-14 PROCEDURE — 63600175 PHARM REV CODE 636 W HCPCS: Performed by: NURSE PRACTITIONER

## 2024-10-14 RX ORDER — MELOXICAM 15 MG/1
15 TABLET ORAL DAILY
Qty: 15 TABLET | Refills: 0 | Status: SHIPPED | OUTPATIENT
Start: 2024-10-14

## 2024-10-14 RX ORDER — KETOROLAC TROMETHAMINE 30 MG/ML
30 INJECTION, SOLUTION INTRAMUSCULAR; INTRAVENOUS
Status: COMPLETED | OUTPATIENT
Start: 2024-10-14 | End: 2024-10-14

## 2024-10-14 RX ORDER — METHOCARBAMOL 500 MG/1
500 TABLET, FILM COATED ORAL
Qty: 90 TABLET | Refills: 0 | Status: SHIPPED | OUTPATIENT
Start: 2024-10-14

## 2024-10-14 RX ADMIN — KETOROLAC TROMETHAMINE 30 MG: 30 INJECTION, SOLUTION INTRAMUSCULAR; INTRAVENOUS at 05:10

## 2024-10-14 NOTE — ED PROVIDER NOTES
Encounter Date: 10/14/2024       History     Chief Complaint   Patient presents with    Chest Pain    Back Pain     Pt states having chest pain going to his back he states it have been going on for 5 days pt states his doctor told him to come to the er denies SOB nausea or vomiting      54 year old male sent from clinic for back pain that radiates into his chest. He stated the pain is deep inside. He has a history diabetes, htn and high cholesterol. He also has a history of left paracervical muscle spasms.     The history is provided by the patient. The history is limited by a language barrier. A  was used.     Review of patient's allergies indicates:  No Known Allergies  History reviewed. No pertinent past medical history.  Past Surgical History:   Procedure Laterality Date    CHOLECYSTECTOMY      SKIN CANCER EXCISION  2021    head     Family History   Problem Relation Name Age of Onset    Diabetes Mother      No Known Problems Father      Diabetes Sister      Diabetes Brother       Social History     Tobacco Use    Smoking status: Never    Smokeless tobacco: Never   Substance Use Topics    Alcohol use: Not Currently    Drug use: Never     Review of Systems   Constitutional:  Negative for fever.   HENT:  Negative for sore throat.    Respiratory:  Negative for shortness of breath.    Cardiovascular:  Positive for chest pain.   Gastrointestinal:  Negative for nausea.   Genitourinary:  Negative for dysuria.   Musculoskeletal:  Positive for back pain.   Skin:  Negative for rash.   Neurological:  Negative for weakness.   Hematological:  Does not bruise/bleed easily.       Physical Exam     Initial Vitals [10/14/24 1529]   BP Pulse Resp Temp SpO2   (!) 161/97 95 18 97.9 °F (36.6 °C) 97 %      MAP       --         Physical Exam    Nursing note and vitals reviewed.  Constitutional: He appears well-developed and well-nourished. No distress.   HENT:   Head: Normocephalic and atraumatic.   Eyes: Pupils are  equal, round, and reactive to light.   Neck: Neck supple.   Normal range of motion.  Cardiovascular:  Normal rate, regular rhythm, normal heart sounds and intact distal pulses.           Pulmonary/Chest: Breath sounds normal.   Abdominal: Abdomen is soft. Bowel sounds are normal.   Musculoskeletal:         General: Normal range of motion.      Cervical back: Normal range of motion and neck supple. Spasms and tenderness present.        Back:      Neurological: He is alert and oriented to person, place, and time. He has normal strength. GCS score is 15. GCS eye subscore is 4. GCS verbal subscore is 5. GCS motor subscore is 6.   Skin: Skin is warm and dry.   Psychiatric: Thought content normal.         ED Course   Procedures  Labs Reviewed   COMPREHENSIVE METABOLIC PANEL - Abnormal       Result Value    Sodium 141      Potassium 3.9      Chloride 106      CO2 24      Glucose 136 (*)     Blood Urea Nitrogen 12.8      Creatinine 0.90      Calcium 10.1      Protein Total 7.9      Albumin 4.4      Globulin 3.5      Albumin/Globulin Ratio 1.3      Bilirubin Total 0.5      ALP 86      ALT 52      AST 37 (*)     eGFR >60      Anion Gap 11.0      BUN/Creatinine Ratio 14     TROPONIN I - Normal    Troponin-I <0.010     B-TYPE NATRIURETIC PEPTIDE - Normal    Natriuretic Peptide <10.0     D DIMER, QUANTITATIVE - Normal    D-Dimer <0.27     LIPASE - Normal    Lipase Level 27     CBC W/ AUTO DIFFERENTIAL    Narrative:     The following orders were created for panel order CBC auto differential.  Procedure                               Abnormality         Status                     ---------                               -----------         ------                     CBC with Differential[[id number]]                           Final result                 Please view results for these tests on the individual orders.   CBC WITH DIFFERENTIAL    WBC 9.51      RBC 5.15      Hgb 15.0      Hct 44.1      MCV 85.6      MCH 29.1      MCHC 34.0       RDW 12.8      Platelet 202      MPV 10.3      Neut % 65.3      Lymph % 25.3      Mono % 6.9      Eos % 2.1      Basophil % 0.2      Lymph # 2.41      Neut # 6.20      Mono # 0.66      Eos # 0.20      Baso # 0.02      IG# 0.02      IG% 0.2      NRBC% 0.0     EXTRA TUBES    Narrative:     The following orders were created for panel order EXTRA TUBES.  Procedure                               Abnormality         Status                     ---------                               -----------         ------                     Red Top Hold[[id number]]                                    Final result               Light Green Top Hold[[id number]]                            Final result               Lavender Top Hold[[id number]]                               Final result               Gold Top Hold[[id number]]                                   Final result               Pink Top Hold[[id number]]                                   Final result                 Please view results for these tests on the individual orders.   RED TOP HOLD    Extra Tube Hold for add-ons.     LIGHT GREEN TOP HOLD    Extra Tube Hold for add-ons.     LAVENDER TOP HOLD    Extra Tube Hold for add-ons.     GOLD TOP HOLD    Extra Tube Hold for add-ons.     PINK TOP HOLD    Extra Tube Hold for add-ons.       EKG Readings: (Independently Interpreted)   Initial Reading: No STEMI. Rhythm: Normal Sinus Rhythm. Heart Rate: 87. Ectopy: No Ectopy. Conduction: Normal. ST Segments: Normal ST Segments. T Waves: Normal. Axis: Normal. Clinical Impression: Normal Sinus Rhythm     ECG Results              EKG 12-lead (In process)        Collection Time Result Time QRS Duration OHS QTC Calculation    10/14/24 15:22:54 10/14/24 15:36:20 94 433                     In process by Interface, Lab In [facility] (10/14/24 15:36:27)                   Narrative:    Test Reason : R07.9,    Vent. Rate : 087 BPM     Atrial Rate : 087 BPM     P-R Int : 158 ms          QRS Dur : 094  ms      QT Int : 360 ms       P-R-T Axes : 059 067 048 degrees     QTc Int : 433 ms    Normal sinus rhythm  Normal ECG  When compared with ECG of 29-AUG-2023 08:25,  No significant change was found    Referred By:             Confirmed By:                       In process by Interface, Lab In [facility] (10/14/24 15:35:40)                   Narrative:    Test Reason : R07.9,    Vent. Rate : 087 BPM     Atrial Rate : 087 BPM     P-R Int : 158 ms          QRS Dur : 094 ms      QT Int : 360 ms       P-R-T Axes : 059 067 048 degrees     QTc Int : 433 ms    Normal sinus rhythm  Normal ECG  When compared with ECG of 29-AUG-2023 08:25,  No significant change was found    Referred By:             Confirmed By:                                   Imaging Results              X-Ray Chest PA And Lateral (Preliminary result)  Result time 10/14/24 16:55:05      Wet Read by Mariangel Russell FNP (10/14/24 16:55:05, [facility], Emergency Medicine)    No acute cardiopulmonary process                                  X-Rays:   Independently Interpreted Readings:   Other Readings:  No acute cardiopulmonary process    Medications   ketorolac injection 30 mg (30 mg Intramuscular Given 10/14/24 1710)     Medical Decision Making  54 year old male sent from clinic for back pain that radiates into his chest. He stated the pain is deep inside. He has a history diabetes, htn and high cholesterol. He also has a history of left paracervical muscle spasms.     Pain is reproduced by palpation of left rhomboid muscles. He has spasms to this area. Mobic and methocarbamal sent to pharmacy. Labs reassuring that it is not cardiac in nature and EKG normal. Follow up with PCP. ER precautions given.     Amount and/or Complexity of Data Reviewed  Labs: ordered. Decision-making details documented in ED Course.  Radiology: ordered and independent interpretation performed. Decision-making details documented in ED Course.     Details:  No acute cardiopulmonary process  ECG/medicine tests: ordered and independent interpretation performed. Decision-making details documented in ED Course.     Details: NSR, rate 87, normal axis    Risk  Prescription drug management.  Risk Details: Risk Details: Given strict ED return precautions. I have spoken with the patient and/or caregivers. I have explained the patient's condition, diagnoses and treatment plan based on the information available to me at this time. I have answered the patient's and/or caregiver's questions and addressed any concerns. The patient and/or caregivers have as good an understanding of the patient's diagnosis, condition and treatment plan as can be expected at this point. The vital signs have been stable. The patient's condition is stable and appropriate for discharge from the emergency department.      The patient will pursue further outpatient evaluation with the primary care physician or other designated or consulting physician as outlined in the discharge instructions. The patient and/or caregivers are agreeable to this plan of care and follow-up instructions have been explained in detail. The patient and/or caregivers have received these instructions in written format and have expressed an understanding of the discharge instructions. The patient and/or caregivers are aware that any significant change in condition or worsening of symptoms should prompt an immediate return to this or the closest emergency department or a call to 911.           Additional MDM:   Differential Diagnosis:   Miocardial infarction, pneumothorax, aortic dissection, pulmonary emboli, pneumonia, among others                          It is important that you follow up with your primary care provider or specialist if indicated for further evaluation, workup, and treatment as necessary. The exam and treatment you received in Emergency Department was for an urgent problem and NOT INTENDED AS COMPLETE CARE. It is  important that you FOLLOW UP with a doctor for ongoing care. If your symptoms become WORSE or you DO NOT IMPROVE and you are unable to reach your health care provider, you should RETURN to the Emergency Department             Clinical Impression:  Final diagnoses:  [R07.9] Chest pain  [M79.18] Rhomboid muscle pain (Primary)  [M62.838] Muscle spasm          ED Disposition Condition    Discharge Stable          ED Prescriptions       Medication Sig Dispense Start Date End Date Auth. Provider    methocarbamoL (ROBAXIN) 500 MG Tab Take 1 tablet (500 mg total) by mouth every meal as needed (muscle pain). May cause drowsiness. Do not drive / drink alcohol. 90 tablet 10/14/2024 -- Mariangel Russell FNP    meloxicam (MOBIC) 15 MG tablet Take 1 tablet (15 mg total) by mouth once daily. 15 tablet 10/14/2024 -- Mariangel Russell FNP          Follow-up Information       Follow up With Specialties Details Why Contact Info    Mariangel Coleman, NP Family Medicine Schedule an appointment as soon as possible for a visit in 1 week If symptoms worsen, As needed [address] [facility] [address] [phone]               Mariangel Russell FNP  10/14/24 4159

## 2024-10-15 ENCOUNTER — TELEPHONE (OUTPATIENT)
Dept: INTERNAL MEDICINE | Facility: CLINIC | Age: 54
End: 2024-10-15

## 2024-10-15 LAB
OHS QRS DURATION: 94 MS
OHS QTC CALCULATION: 433 MS

## 2024-10-15 NOTE — TELEPHONE ENCOUNTER
Please schedule next available for follow up visit.   He was scheduled yesterday for f/u visit but was sent to ER for evaluation due to his reported symptoms of chest pain radiating to back pain.

## 2024-10-16 ENCOUNTER — TELEPHONE (OUTPATIENT)
Dept: INTERNAL MEDICINE | Facility: CLINIC | Age: 54
End: 2024-10-16

## 2025-05-16 DIAGNOSIS — I10 PRIMARY HYPERTENSION: ICD-10-CM

## 2025-05-16 DIAGNOSIS — E11.9 TYPE 2 DIABETES MELLITUS WITHOUT COMPLICATION, WITH LONG-TERM CURRENT USE OF INSULIN: ICD-10-CM

## 2025-05-16 DIAGNOSIS — Z79.4 TYPE 2 DIABETES MELLITUS WITHOUT COMPLICATION, WITH LONG-TERM CURRENT USE OF INSULIN: ICD-10-CM

## 2025-05-16 NOTE — TELEPHONE ENCOUNTER
Pt's pharmacy requesting refill for pt's amLODIPine (NORVASC) 10 MG tablet and lisinopriL (PRINIVIL,ZESTRIL) 2.5 MG tablet           LOV: 7/11/24    NOV:  None

## 2025-05-19 DIAGNOSIS — Z79.4 TYPE 2 DIABETES MELLITUS WITHOUT COMPLICATION, WITH LONG-TERM CURRENT USE OF INSULIN: ICD-10-CM

## 2025-05-19 DIAGNOSIS — E11.9 TYPE 2 DIABETES MELLITUS WITHOUT COMPLICATION, WITH LONG-TERM CURRENT USE OF INSULIN: ICD-10-CM

## 2025-05-19 RX ORDER — LISINOPRIL 2.5 MG/1
2.5 TABLET ORAL
Qty: 90 TABLET | Refills: 1 | Status: SHIPPED | OUTPATIENT
Start: 2025-05-19

## 2025-05-19 RX ORDER — AMLODIPINE BESYLATE 10 MG/1
10 TABLET ORAL
Qty: 90 TABLET | Refills: 1 | Status: SHIPPED | OUTPATIENT
Start: 2025-05-19

## 2025-05-20 RX ORDER — GLIPIZIDE 5 MG/1
10 TABLET ORAL 2 TIMES DAILY WITH MEALS
Qty: 360 TABLET | Refills: 0 | Status: SHIPPED | OUTPATIENT
Start: 2025-05-20

## 2025-07-24 ENCOUNTER — OFFICE VISIT (OUTPATIENT)
Dept: INTERNAL MEDICINE | Facility: CLINIC | Age: 55
End: 2025-07-24

## 2025-07-24 VITALS
HEART RATE: 89 BPM | WEIGHT: 197 LBS | SYSTOLIC BLOOD PRESSURE: 130 MMHG | RESPIRATION RATE: 16 BRPM | BODY MASS INDEX: 31.66 KG/M2 | DIASTOLIC BLOOD PRESSURE: 62 MMHG | OXYGEN SATURATION: 100 % | TEMPERATURE: 98 F | HEIGHT: 66 IN

## 2025-07-24 DIAGNOSIS — Z12.11 COLON CANCER SCREENING: ICD-10-CM

## 2025-07-24 DIAGNOSIS — Z79.4 TYPE 2 DIABETES MELLITUS WITHOUT COMPLICATION, WITH LONG-TERM CURRENT USE OF INSULIN: Primary | ICD-10-CM

## 2025-07-24 DIAGNOSIS — E11.9 TYPE 2 DIABETES MELLITUS WITHOUT COMPLICATION, WITH LONG-TERM CURRENT USE OF INSULIN: Primary | ICD-10-CM

## 2025-07-24 DIAGNOSIS — E78.5 HYPERLIPIDEMIA, UNSPECIFIED HYPERLIPIDEMIA TYPE: ICD-10-CM

## 2025-07-24 DIAGNOSIS — I10 PRIMARY HYPERTENSION: ICD-10-CM

## 2025-07-24 DIAGNOSIS — R01.1 HEART MURMUR: ICD-10-CM

## 2025-07-24 LAB — HBA1C MFR BLD: 8.9 %

## 2025-07-24 PROCEDURE — 99214 OFFICE O/P EST MOD 30 MIN: CPT | Mod: PBBFAC | Performed by: NURSE PRACTITIONER

## 2025-07-24 PROCEDURE — 83036 HEMOGLOBIN GLYCOSYLATED A1C: CPT | Mod: PBBFAC | Performed by: NURSE PRACTITIONER

## 2025-07-24 PROCEDURE — 99214 OFFICE O/P EST MOD 30 MIN: CPT | Mod: S$PBB,,, | Performed by: NURSE PRACTITIONER

## 2025-07-24 RX ORDER — INSULIN GLARGINE 100 [IU]/ML
25 INJECTION, SOLUTION SUBCUTANEOUS 2 TIMES DAILY
Qty: 15 ML | Refills: 3 | Status: SHIPPED | OUTPATIENT
Start: 2025-07-24

## 2025-07-24 RX ORDER — ROSUVASTATIN CALCIUM 40 MG/1
40 TABLET, COATED ORAL NIGHTLY
Qty: 90 TABLET | Refills: 2 | Status: SHIPPED | OUTPATIENT
Start: 2025-07-24

## 2025-07-24 RX ORDER — GLIPIZIDE 5 MG/1
10 TABLET ORAL 2 TIMES DAILY WITH MEALS
Qty: 360 TABLET | Refills: 2 | Status: SHIPPED | OUTPATIENT
Start: 2025-07-24

## 2025-07-24 RX ORDER — METFORMIN HYDROCHLORIDE 1000 MG/1
1000 TABLET ORAL 2 TIMES DAILY WITH MEALS
Qty: 180 TABLET | Refills: 2 | Status: SHIPPED | OUTPATIENT
Start: 2025-07-24

## 2025-07-24 RX ORDER — OMEPRAZOLE 20 MG/1
20 CAPSULE, DELAYED RELEASE ORAL DAILY
Qty: 90 CAPSULE | Refills: 2 | Status: SHIPPED | OUTPATIENT
Start: 2025-07-24

## 2025-07-24 NOTE — ASSESSMENT & PLAN NOTE
Asymptomatic. New finding noted. 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 With new progressive neuro symptoms s/p Hyper-CVAD 1B (D1 12/13/17); Today is D17. T/S findings concerning for lymphoma relapse vs. IT chemo related changes. On steroids. Most recently issues with urinary retention, BL leg weakness, numbness from nipple line down (symptoms wax & wane).    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  -12/7/17, however, LP & MRI brain (single focus of enhancement & subtle restriction within the superior gyrus of the posterior L temporal lobe suspicious for lymphoma) indicate recurrent disease in CSF. Underwent IT chemo (12/7). -12/13/17, admitted to hospital with BLE weakness & neuropathy. Had been getting worse past few weeks. Neuro exam at that time sensory level deficit at T10 as well as focal weakness at level of C5-C6, L2-4. Thought to be r/t moderate foraminal stenosis in L spine seen on MRI & vincristine toxicity. More rarely IT MTX causing myelopathy. Started Hyper CVAD 1 B (12/13) with IT chemo (12/13). With persistent neuro symptoms --> had MRI C/T/L spine 12/15/17 no malignant involvement of LS, TS, CS. IT chemo given 12/19 & 12/21.  - 2/22/17, repeated MRI on T/L spine with extensive epidural enhancement diffusely throughout T spine (predominant dorsally with effacement of the thecal sac) and  "epidural enhancement L5-S1 - findings concerning for epidural extension of lymphoma. C spine negative. Brain MRI 12/23/17 with new mild symmetric and smooth thickening and enhancement of the dura (could be due to early dural involvement by lymphoma) and subtle non nodular mild smooth enhancement along the leptomeningeal surfaces, more pronounced since 12/2/17, not significantly different since 9/10/2017 (might represent early leptomeningeal involvement). A diffusion scan (DWI) performed on the T spine 12/23/17 showed no evidence of restricted diffusion (which would be expected to be seen with lymphoma) along the previously noted posterior epidural/dural enhancement seen on the T spine MRI from 12/22.  - 12/26: S/P diagnostic LP, flow inconclusive.  - 12/28: Per neurosurgery recs, transfused PLT > 100k; obtained MRI of thoracic spine with plan for biopsy.  - 12/29: Per discussion between neurosurgery and neuroradiology, the thoracic enhancement has resolved, \"suggesting that it could possibly have been caused from epidural venous dilation associated with low pressure or possible repeated lumbar punctures\". There is no area of enhancement to biopsy  - Urinary retention improved, start steroid taper, on 12/29 decresed Decadron to 4mg Q12H (from 4 mg Q8H)  - Continue supportive care  - Physical therapy    Future Plans (all tentative)  - Since there is no definitive CNS disease, there is no role for Craniospinal XRT   - On Neuro-surgery schedule for Omaya placement 1/3/2017. Will keep the schedule now and reassess the need for Omaya in the next few days. Will likely postpone Omaya placement until seen by Dr. Amaya  - Initial plan was to start HyperCVAD 1 A 1/4/2017. Includes: decadron 40 mg PO D1-4, Cytoxan 300 mg/m2 q 12 D1-3, Oncovin(vincristine) 2 g D4, & doxorubicin 50 mg/m2 D4. Will postpone further chemo until seen in clinic  - Needs BMT consult, sent in-basket message to Dr. Amaya her outpatient physician. "   - Needs TCU placement, social work assessing & sending referrals. Wants to be closer to home in [facility].    # Pancytopenia. 2/2 to lymphoma & chemotherapy.   - Count recovering. Monitor CBC w diff daily  - D/C filgrastim.  - Transfuse to keep HgB > 7, PLT > 10.    ID:  # Fever. Tmax of 100.7 overnight 12/16, afebrile 12/17-18. Blood cultures drawn from both PICC lines and peripherally and are NTD. Afebrile since 12/16.   - Started on Levaquin 500 mg PO daily (12/18/17-->12/27/17) d/c with count recovery.  - Counts recovered      #PPx. Continue PPx acyclovir 400 mg BID.   - Consider pentamidine for PJP ppx depending on upcoming testing. With more lymphotoxic regimen & need for HD steroids increased risk PCP.       ENDO:  #Secondary adrenal insufficiency. Followed by outpt Endo.   - Continue hydrocortisone (20 mg PO every AM, 15 mg every PM 1400).       CV:  #Bradycardia (hx. Tachycardia). Asymptomatic. Of note hx. Fall r/t leg weakness, denies dizziness.  - Asymptomatic  - High electrolyte SS.    DERM:  #Cutaneous T cell lymphoma. Has rash on L forehead that has been bx as T cell lymphoma. Tested and negative for VZV and infection. Continue to monitor.     GI:  #Constipation Resolved, on scheduled bowel regimen, PRN mag citrate is effective if senna/miralax not enough.   FEN: No MIVF, PRN lyte replacement (high r/t bradycardia, check Mg Ph MWF). RDAT.  PPX (DVT/GI): ambulate/mechanical (hold Lovenox with possible brain involvement of lymphoma, TCP & frequent LP needed) please encourage ambulation & PCD's. Change to Protonix with HD steroids.  LINES/DRAINS: PICC (12/13/17 in L basilic).  CONSULTS: PT, OT, Neurology, Neurosurgery.  CODE: FULL.  DISPO: Unknown at this time. Plan to d/c to TCU with heme/onc & neuro f/u. May need BMT consult & f/u in BMT clinic.     Discussed with Dr. Nik Werner MD, PhD  Hem/Onc Fellow      Interval History    No acute events reported overnight. No further urinary  retention. States LE weakness is the same.    Physical Exam   Temp: 96.3  F (35.7  C) Temp src: Oral BP: 107/61   Heart Rate: 67 Resp: 18 SpO2: 95 % O2 Device: None (Room air)    Vitals:    12/24/17 1018 12/26/17 1231 12/27/17 1216   Weight: 88.5 kg (195 lb 1.6 oz) 86.3 kg (190 lb 3.2 oz) 84.5 kg (186 lb 4.6 oz)     Vital Signs with Ranges  Temp:  [96.3  F (35.7  C)-97.8  F (36.6  C)] 96.3  F (35.7  C)  Heart Rate:  [] 67  Resp:  [16-18] 18  BP: ()/(61-79) 107/61  SpO2:  [93 %-96 %] 95 %  I/O last 3 completed shifts:  In: 20 [I.V.:20]  Out: 950 [Urine:950]    Constitutional: Pleasant female seen sitting up in bed. No apparent distress, and appears stated age.  Eyes: Lids and lashes normal, sclera clear, conjunctiva normal.  ENT: Normocephalic, MMM, tonsils without erythema or exudates, gums normal and good dentition.   Respiratory: No increased work of breathing, good air exchange, clear to auscultation bilaterally, no crackles or wheezing.  Cardiovascular: Regular rate and rhythm, normal S1 and S2, and no murmur noted.  GI: No masses or scars. +BS. Soft. No tenderness on palpation.  Skin: Rash medial aspect of forehead. Erythematous scattered rash along bilateral upper extremities  Extremities: There is no redness, warmth, or swelling of the joints. No lower extremity edema. No cyanosis.  Neurologic: Awake, alert, oriented to name, place and time. B/L UE 5/5, LE strength 4/5, CN II-XII grossly intact.  Vascular access: PICC, c/d/i.    Medications        dexamethasone  4 mg Oral Q12H DEMI     pantoprazole  40 mg Oral QAM     senna-docusate  2 tablet Oral BID     polyethylene glycol  17 g Oral Daily     hydrocortisone  20 mg Oral QAM    And     hydrocortisone  15 mg Oral Daily     sodium chloride (PF)  10 mL Intracatheter Q7 Days     influenza quadrivalent (PF) vacc age 3 yrs and older  0.5 mL Intramuscular Prior to discharge     potassium chloride  20 mEq Oral Daily     acyclovir  400 mg Oral Daily      FLUoxetine  60 mg Oral Daily     allopurinol  300 mg Oral Daily     Data    CBC  Recent Labs   Lab Test  12/30/17   0632  12/29/17   0712  12/28/17   2104  12/28/17   0625  12/27/17   0618   WBC  5.4  6.4   --   6.6  3.5*   HGB  9.6*  9.4*   --   9.6*  9.1*   HCT  29.3*  28.6*   --   28.4*  27.7*   PLT  96*  119*  115*  42*  63*   MCV  101*  100   --   99  99   RBC  2.91*  2.86*   --   2.86*  2.81*   ANEU  3.4  5.3   --   5.7  2.4       BMP  Recent Labs   Lab Test  12/30/17   0632  12/29/17   0712  12/28/17   0625  12/27/17   0618   12/25/17   0559   12/22/17   0640   12/20/17   0630   NA  142  141  142  140   < >  144   < >  145*   < >  145*   POTASSIUM  4.2  4.0  4.0  4.0   < >  4.1   < >  3.5   < >  3.7   CHLORIDE  103  103  104  104   < >  110*   < >  111*   < >  110*   CO2  30  30  32  27   < >  27   < >  27   < >  29   BUN  32*  27  27  24   < >  21   < >  15   < >  15   CR  0.60  0.62  0.58  0.60   < >  0.54   < >  0.55   < >  0.59   NATY  9.4  9.3  9.2  9.8   < >  9.0   < >  8.9   < >  8.8   MAG   --   2.5*   --    --    --   2.2   --   2.2   --   2.3    < > = values in this interval not displayed.       LFT  Recent Labs   Lab Test  12/28/17   0625  12/25/17   0559  12/21/17   0728  12/19/17   0637   PROTTOTAL  6.2*  6.1*  5.7*  5.5*   ALBUMIN  3.2*  3.1*  2.9*  2.6*   AST  15  17  67*  23   ALT  83*  105*  138*  52*   BILITOTAL  0.6  0.4  0.8  0.4   ALKPHOS  76  77  54  48       Coagulation  Recent Labs   Lab Test  12/22/17   0640  11/20/17   1035  09/16/17   0739  09/14/17   0702  08/25/17   0643   08/03/17   0504   INR  0.96  1.01  1.04  1.16*  1.21*   < >  1.10   PTT   --   24  24   --   27   --   30    < > = values in this interval not displayed.

## 2025-07-24 NOTE — PROGRESS NOTES
Mariangel L Virginia, NP   [facility]  [address] [address]      PATIENT NAME: Sinan Arreola  : 1970  DATE: 25  MRN: [id number]        History of Present Illness / Problem Focused Workflow     Sinan Arreola presents to the clinic with Follow-up (Labs completed. )     54 yo male unaccompanied, using  services here for f/u for DM. Last seen 24. PMhx includes BCC, left rib fracture, tobacco abuse. Active diagnosis include chest/ rib pain, type 2 DM, HTN, HLD, obesity, anterior listhesis lumbar disc.   POC A1c 8.9%. CBG checks at home varies. Does report having low sugar yesterday afternoon around 78; he felt anxious but after he ate something it was normal. This morning cbg 130. Reports compliance with medications mostly. Denies SOB, weakness, dizziness, palpitations. No other concern stated today.      Review of Systems     Review of Systems   Constitutional: Negative.    HENT: Negative.     Eyes: Negative.    Respiratory: Negative.     Cardiovascular: Negative.    Gastrointestinal: Negative.    Endocrine: Negative.    Genitourinary: Negative.    Musculoskeletal: Negative.    Skin: Negative.    Allergic/Immunologic: Negative.    Neurological: Negative.    Hematological: Negative.    Psychiatric/Behavioral: Negative.         Medical / Social / Family History     No past medical history on file.     Past Surgical History:   Procedure Laterality Date    CHOLECYSTECTOMY      SKIN CANCER EXCISION      head       Social History[1]     Family History   Problem Relation Name Age of Onset    Diabetes Mother      No Known Problems Father      Diabetes Sister      Diabetes Brother          Medications and Allergies     Medications  Current Outpatient Medications   Medication Instructions    amLODIPine (NORVASC) 10 mg, Oral    aspirin (ECOTRIN) 81 mg, Oral, Daily    BASAGLAR KWIKPEN U-100  "INSULIN 25 Units, Subcutaneous, 2 times daily    diclofenac (VOLTAREN) 75 mg, Oral, 2 times daily PRN, Take with food/ milk. Avoid other NSAIDs.    glipiZIDE (GLUCOTROL) 10 mg, Oral, 2 times daily with meals    lisinopriL (PRINIVIL,ZESTRIL) 2.5 mg, Oral    meloxicam (MOBIC) 15 mg, Oral, Daily    metFORMIN (GLUCOPHAGE) 1,000 mg, Oral, 2 times daily with meals    methocarbamoL (ROBAXIN) 500 mg, Oral, 3 times daily with meals PRN, May cause drowsiness. Do not drive / drink alcohol.    omeprazole (PRILOSEC) 20 mg, Oral, Daily    rosuvastatin (CRESTOR) 40 mg, Oral, Nightly    SITagliptin phosphate (JANUVIA) 100 mg, Oral, Daily    TECHLITE PEN NEEDLE 32 gauge x 5/32" Ndle USE DAILY FOR INSULIN ADMINISTRATION    triamcinolone acetonide 0.1% (KENALOG) 0.1 % cream Topical (Top), 2 times daily       Allergies  Review of patient's allergies indicates:  No Known Allergies    Physical Examination     Visit Vitals  /62 (BP Location: Left arm, Patient Position: Sitting)   Pulse 89   Temp 98.2 °F (36.8 °C) (Oral)   Resp 16   Ht 5' 6" (1.676 m)   Wt 89.4 kg (197 lb)   SpO2 100%   BMI 31.80 kg/m²       Physical Exam  Constitutional:       General: He is not in acute distress.     Appearance: Normal appearance. He is obese. He is not ill-appearing, toxic-appearing or diaphoretic.   HENT:      Head: Normocephalic.   Cardiovascular:      Rate and Rhythm: Normal rate and regular rhythm.      Heart sounds: Murmur heard.   Pulmonary:      Effort: Pulmonary effort is normal. No respiratory distress.      Breath sounds: Normal breath sounds. No stridor. No wheezing, rhonchi or rales.   Skin:     General: Skin is warm and dry.   Neurological:      General: No focal deficit present.      Mental Status: He is alert and oriented to person, place, and time. Mental status is at baseline.      Motor: No weakness.      Coordination: Coordination normal.      Gait: Gait normal.   Psychiatric:         Mood and Affect: Mood normal.         " Behavior: Behavior normal.         Thought Content: Thought content normal.         Judgment: Judgment normal.           Results     Lab Results   Component Value Date    WBC 9.51 10/14/2024    RBC 5.15 10/14/2024    HGB 15.0 10/14/2024    HCT 44.1 10/14/2024    MCV 85.6 10/14/2024    MCH 29.1 10/14/2024    MCHC 34.0 10/14/2024    RDW 12.8 10/14/2024     10/14/2024    MPV 10.3 10/14/2024     Sodium   Date Value Ref Range Status   10/14/2024 141 136 - 145 mmol/L Final     Potassium   Date Value Ref Range Status   10/14/2024 3.9 3.5 - 5.1 mmol/L Final     Chloride   Date Value Ref Range Status   10/14/2024 106 98 - 107 mmol/L Final     CO2   Date Value Ref Range Status   10/14/2024 24 22 - 29 mmol/L Final     Glucose   Date Value Ref Range Status   10/14/2024 136 (H) 74 - 100 mg/dL Final     Blood Urea Nitrogen   Date Value Ref Range Status   10/14/2024 12.8 8.4 - 25.7 mg/dL Final     Creatinine   Date Value Ref Range Status   10/14/2024 0.90 0.73 - 1.18 mg/dL Final     Calcium   Date Value Ref Range Status   10/14/2024 10.1 8.4 - 10.2 mg/dL Final     Protein Total   Date Value Ref Range Status   10/14/2024 7.9 6.4 - 8.3 gm/dL Final     Albumin   Date Value Ref Range Status   10/14/2024 4.4 3.5 - 5.0 g/dL Final     Bilirubin Total   Date Value Ref Range Status   10/14/2024 0.5 <=1.5 mg/dL Final     ALP   Date Value Ref Range Status   10/14/2024 86 40 - 150 unit/L Final     AST   Date Value Ref Range Status   10/14/2024 37 (H) 5 - 34 unit/L Final     ALT   Date Value Ref Range Status   10/14/2024 52 0 - 55 unit/L Final     Estimated GFR-Non    Date Value Ref Range Status   06/10/2022 >60 mls/min/1.73/m2 Final     Lab Results   Component Value Date    CHOL 118 01/04/2024     Lab Results   Component Value Date    HDL 36 01/04/2024     Lab Results   Component Value Date    TRIG 214 (H) 01/04/2024     Lab Results   Component Value Date    LDL 39.00 (L) 01/04/2024     Lab Results   Component Value  Date    TSH 1.776 01/04/2024     Lab Results   Component Value Date    PHUR 6.5 07/30/2021    PROTEINUA 10 (A) 07/30/2021    GLUCUA 30 (A) 07/30/2021    KETONESU Negative 07/30/2021    OCCULTUA Negative 07/30/2021    NITRITE Negative 07/30/2021    LEUKOCYTESUR Negative 07/30/2021     Lab Results   Component Value Date    HGBA1C 8.4 (H) 10/10/2024    HGBA1C 8.7 (H) 07/08/2024    HGBA1C 8.4 (H) 04/04/2024     Lab Results   Component Value Date    MICALBCREAT 34.7 (H) 10/10/2024        Assessment       ICD-10-CM ICD-9-CM   1. Type 2 diabetes mellitus without complication, with long-term current use of insulin  E11.9 250.00    Z79.4 V58.67   2. Colon cancer screening  Z12.11 V76.51   3. Primary hypertension  I10 401.9   4. Hyperlipidemia, unspecified hyperlipidemia type  E78.5 272.4   5. Heart murmur  R01.1 785.2       Plan       Problem List Items Addressed This Visit          Cardiac/Vascular    Heart murmur    Current Assessment & Plan   Asymptomatic. New finding noted. Obtain Echo         Relevant Orders    Echo    Hypertension    Overview   Current medication amlodipine 10 mg, lisinopril 2.5 mg         Current Assessment & Plan   BP Readings from Last 3 Encounters:   07/24/25 130/62   10/14/24 (!) 160/90   10/14/24 131/75     Follow low sodium diet, < 2 gm/day (avoid high salty foods such as processed meats/ sausage/oneal/ sandwich meat, chips, pickles, cheese, crackers and soft drinks/ electrolyte replacement drinks).  Avoid tobacco/ alcohol use  Educated on health benefits of at least 5 days/ week of 30 minutes moderate intensity exercise (brisk walking) and 2 or more days/ week of muscle strength activities  Daily ASA 81 mg for CV prevention  Continue current medication regimen           Relevant Orders    CBC Auto Differential    Comprehensive Metabolic Panel    Lipid Panel    Microalbumin/Creatinine Ratio, Urine       Endocrine    Type 2 diabetes mellitus - Primary    Current Assessment & Plan   Lab Results    Component Value Date    HGBA1C 8.4 (H) 10/10/2024   CBGs: varies per pt, admits to compliance with checks   Hypoglycemia: denies  Microalbumin/ Cr ratio:   Lab Results   Component Value Date    MICALBCREAT 34.7 (H) 10/10/2024       Educated on ADA diet:  1. Avoid/ decrease high carbohydrate foods (rice, pasta, bread, candy, sweets, carbonated beverages)  Educated on health benefits of at least 5 days/ week of 30 minutes moderate intensity exercise (brisk walking) and 2 or more days/ week of muscle strength activities  Avoid alcohol or tobacco use if applicable  Eye exam: 1/9/24 no DR , next visit   Foot exam: 1/9/24, next visit   Continue lisinopril 2.5 mg, rosuvastatin 40 mg and daily aspirin 81 mg  Change insulin to BID, 25 units SC q AM and qHS, continue glipizide 10 mg BID, metformin 1000 mg BID, Januvia 100 mg once daily- concerned about non compliance as some refills were older and pt would have been out of medications   F/u 4 weeks for f/u with lab review          Relevant Medications    metFORMIN (GLUCOPHAGE) 1000 MG tablet    rosuvastatin (CRESTOR) 40 MG Tab    SITagliptin phosphate (JANUVIA) 100 MG Tab    glipiZIDE (GLUCOTROL) 5 MG tablet    insulin glargine U-100, Lantus, (BASAGLAR KWIKPEN U-100 INSULIN) 100 unit/mL (3 mL) InPn pen    Other Relevant Orders    POCT HEMOGLOBIN A1C (Completed)    CBC Auto Differential    Comprehensive Metabolic Panel    Lipid Panel    Microalbumin/Creatinine Ratio, Urine     Other Visit Diagnoses         Colon cancer screening      Prior FIT negative 2022    Relevant Orders    Cologuard Screening (Multitarget Stool DNA)      Hyperlipidemia, unspecified hyperlipidemia type      F/u with labs, continue meds     Relevant Medications    rosuvastatin (CRESTOR) 40 MG Tab    Other Relevant Orders    CBC Auto Differential    Comprehensive Metabolic Panel    Lipid Panel    Microalbumin/Creatinine Ratio, Urine            Follow up in about 4 weeks (around 8/21/2025) for diabetes  (foot and eye exam) with fasting labs.    Signature:  Mariangel Coleman NP  [facility]  [address] [facility] [address]    Date of encounter: 7/24/25         [1]   Social History  Socioeconomic History    Marital status: Single   Tobacco Use    Smoking status: Never    Smokeless tobacco: Never   Substance and Sexual Activity    Alcohol use: Not Currently    Drug use: Never     Social Drivers of Health     Physical Activity: Inactive (1/9/2024)    Exercise Vital Sign     Days of Exercise per Week: 0 days     Minutes of Exercise per Session: 0 min

## 2025-07-24 NOTE — ASSESSMENT & PLAN NOTE
Lab Results   Component Value Date    HGBA1C 8.4 (H) 10/10/2024   CBGs: varies per pt, admits to compliance with checks   Hypoglycemia: denies  Microalbumin/ Cr ratio:   Lab Results   Component Value Date    MICALBCREAT 34.7 (H) 10/10/2024       Educated on ADA diet:  1. Avoid/ decrease high carbohydrate foods (rice, pasta, bread, candy, sweets, carbonated beverages)  Educated on health benefits of at least 5 days/ week of 30 minutes moderate intensity exercise (brisk walking) and 2 or more days/ week of muscle strength activities  Avoid alcohol or tobacco use if applicable  Eye exam: 1/9/24 no DR , next visit   Foot exam: 1/9/24, next visit   Continue lisinopril 2.5 mg, rosuvastatin 40 mg and daily aspirin 81 mg  Change insulin to BID, 25 units SC q AM and qHS, continue glipizide 10 mg BID, metformin 1000 mg BID, Januvia 100 mg once daily- concerned about non compliance as some refills were older and pt would have been out of medications   F/u 4 weeks for f/u with lab review

## 2025-07-24 NOTE — ASSESSMENT & PLAN NOTE
BP Readings from Last 3 Encounters:   07/24/25 130/62   10/14/24 (!) 160/90   10/14/24 131/75     Follow low sodium diet, < 2 gm/day (avoid high salty foods such as processed meats/ sausage/oneal/ sandwich meat, chips, pickles, cheese, crackers and soft drinks/ electrolyte replacement drinks).  Avoid tobacco/ alcohol use  Educated on health benefits of at least 5 days/ week of 30 minutes moderate intensity exercise (brisk walking) and 2 or more days/ week of muscle strength activities  Daily ASA 81 mg for CV prevention  Continue current medication regimen

## 2025-08-20 ENCOUNTER — HOSPITAL ENCOUNTER (OUTPATIENT)
Dept: CARDIOLOGY | Facility: HOSPITAL | Age: 55
Discharge: HOME | End: 2025-08-20
Attending: NURSE PRACTITIONER

## 2025-08-20 VITALS
WEIGHT: 197 LBS | BODY MASS INDEX: 31.66 KG/M2 | SYSTOLIC BLOOD PRESSURE: 154 MMHG | DIASTOLIC BLOOD PRESSURE: 82 MMHG | HEIGHT: 66 IN

## 2025-08-20 DIAGNOSIS — R01.1 HEART MURMUR: ICD-10-CM

## 2025-08-20 PROCEDURE — 93306 TTE W/DOPPLER COMPLETE: CPT

## 2025-08-21 LAB
APICAL FOUR CHAMBER EJECTION FRACTION: 65 %
APICAL TWO CHAMBER EJECTION FRACTION: 56 %
AV INDEX (PROSTH): 0.65
AV MEAN GRADIENT: 5 MMHG
AV PEAK GRADIENT: 9 MMHG
AV VALVE AREA BY VELOCITY RATIO: 2.3 CM²
AV VALVE AREA: 2 CM²
AV VELOCITY RATIO: 0.73
BSA FOR ECHO PROCEDURE: 2.04 M2
CV ECHO LV RWT: 0.39 CM
DOP CALC AO PEAK VEL: 1.5 M/S
DOP CALC AO VTI: 30.5 CM
DOP CALC LVOT AREA: 3.1 CM2
DOP CALC LVOT DIAMETER: 2 CM
DOP CALC LVOT PEAK VEL: 1.1 M/S
DOP CALC MV VTI: 30.6 CM
DOP CALCLVOT PEAK VEL VTI: 19.8 CM
E WAVE DECELERATION TIME: 228 MSEC
E/A RATIO: 1.05
E/E' RATIO: 11 M/S
ECHO LV POSTERIOR WALL: 0.9 CM (ref 0.6–1.1)
FRACTIONAL SHORTENING: 34.8 % (ref 28–44)
INTERVENTRICULAR SEPTUM: 1.3 CM (ref 0.6–1.1)
LEFT ATRIUM SIZE: 3.7 CM
LEFT INTERNAL DIMENSION IN SYSTOLE: 3 CM (ref 2.1–4)
LEFT VENTRICLE DIASTOLIC VOLUME INDEX: 48.24 ML/M2
LEFT VENTRICLE DIASTOLIC VOLUME: 96 ML
LEFT VENTRICLE END DIASTOLIC VOLUME APICAL 2 CHAMBER: 111.23 ML
LEFT VENTRICLE END DIASTOLIC VOLUME APICAL 4 CHAMBER INDEX BSA: 55.15 ML/M2
LEFT VENTRICLE END DIASTOLIC VOLUME APICAL 4 CHAMBER: 109.75 ML
LEFT VENTRICLE MASS INDEX: 91.1 G/M2
LEFT VENTRICLE SYSTOLIC VOLUME INDEX: 18.1 ML/M2
LEFT VENTRICLE SYSTOLIC VOLUME: 36 ML
LEFT VENTRICULAR INTERNAL DIMENSION IN DIASTOLE: 4.6 CM (ref 3.5–6)
LEFT VENTRICULAR MASS: 181.2 G
LV LATERAL E/E' RATIO: 11.2 M/S
LV SEPTAL E/E' RATIO: 11.2 M/S
LVED V (TEICH): 96.05 ML
LVES V (TEICH): 35.67 ML
LVOT MG: 2.52 MMHG
LVOT MV: 0.72 CM/S
MV MEAN GRADIENT: 2 MMHG
MV PEAK A VEL: 0.96 M/S
MV PEAK E VEL: 1.01 M/S
MV PEAK GRADIENT: 4 MMHG
MV STENOSIS PRESSURE HALF TIME: 66.02 MS
MV VALVE AREA BY CONTINUITY EQUATION: 2.03 CM2
MV VALVE AREA P 1/2 METHOD: 3.33 CM2
OHS CV CPX PATIENT HEIGHT IN: 66
OHS CV RV/LV RATIO: 0.89 CM
OHS LV EJECTION FRACTION SIMPSONS BIPLANE MOD: 61 %
PISA TR MAX VEL: 1.7 M/S
RA PRESSURE ESTIMATED: 3 MMHG
RIGHT VENTRICLE DIASTOLIC BASEL DIMENSION: 4.1 CM
RIGHT VENTRICULAR END-DIASTOLIC DIMENSION: 4.09 CM
RV TB RVSP: 5 MMHG
TDI LATERAL: 0.09 M/S
TDI SEPTAL: 0.09 M/S
TDI: 0.09 M/S
TR MAX PG: 12 MMHG
TRICUSPID ANNULAR PLANE SYSTOLIC EXCURSION: 2 CM
TV REST PULMONARY ARTERY PRESSURE: 15 MMHG
Z-SCORE OF LEFT VENTRICULAR DIMENSION IN END DIASTOLE: -2.27
Z-SCORE OF LEFT VENTRICULAR DIMENSION IN END SYSTOLE: -1.32

## 2025-08-25 ENCOUNTER — LAB VISIT (OUTPATIENT)
Dept: LAB | Facility: HOSPITAL | Age: 55
End: 2025-08-25
Attending: NURSE PRACTITIONER

## 2025-08-25 ENCOUNTER — RESULTS FOLLOW-UP (OUTPATIENT)
Dept: INTERNAL MEDICINE | Facility: CLINIC | Age: 55
End: 2025-08-25

## 2025-08-25 DIAGNOSIS — I10 PRIMARY HYPERTENSION: ICD-10-CM

## 2025-08-25 DIAGNOSIS — E11.9 TYPE 2 DIABETES MELLITUS WITHOUT COMPLICATION, WITH LONG-TERM CURRENT USE OF INSULIN: ICD-10-CM

## 2025-08-25 DIAGNOSIS — Z79.4 TYPE 2 DIABETES MELLITUS WITHOUT COMPLICATION, WITH LONG-TERM CURRENT USE OF INSULIN: ICD-10-CM

## 2025-08-25 DIAGNOSIS — E78.5 HYPERLIPIDEMIA, UNSPECIFIED HYPERLIPIDEMIA TYPE: ICD-10-CM

## 2025-08-25 LAB
ALBUMIN SERPL-MCNC: 4 G/DL (ref 3.5–5)
ALBUMIN/CREAT UR: 20.5 MG/GM CR (ref 0–30)
ALBUMIN/GLOB SERPL: 1.1 RATIO (ref 1.1–2)
ALP SERPL-CCNC: 56 UNIT/L (ref 40–150)
ALT SERPL-CCNC: 28 UNIT/L (ref 0–55)
ANION GAP SERPL CALC-SCNC: 7 MEQ/L
AST SERPL-CCNC: 20 UNIT/L (ref 11–45)
BASOPHILS # BLD AUTO: 0.02 X10(3)/MCL
BASOPHILS NFR BLD AUTO: 0.3 %
BILIRUB SERPL-MCNC: 0.5 MG/DL
BUN SERPL-MCNC: 18.8 MG/DL (ref 8.4–25.7)
CALCIUM SERPL-MCNC: 9.5 MG/DL (ref 8.4–10.2)
CHLORIDE SERPL-SCNC: 109 MMOL/L (ref 98–107)
CHOLEST SERPL-MCNC: 103 MG/DL
CHOLEST/HDLC SERPL: 3 {RATIO} (ref 0–5)
CO2 SERPL-SCNC: 26 MMOL/L (ref 22–29)
CREAT SERPL-MCNC: 0.8 MG/DL (ref 0.72–1.25)
CREAT UR-MCNC: 122 MG/DL (ref 63–166)
CREAT/UREA NIT SERPL: 24
EOSINOPHIL # BLD AUTO: 0.17 X10(3)/MCL (ref 0–0.9)
EOSINOPHIL NFR BLD AUTO: 2.2 %
ERYTHROCYTE [DISTWIDTH] IN BLOOD BY AUTOMATED COUNT: 14.1 % (ref 11.5–17)
GFR SERPLBLD CREATININE-BSD FMLA CKD-EPI: >60 ML/MIN/1.73/M2
GLOBULIN SER-MCNC: 3.6 GM/DL (ref 2.4–3.5)
GLUCOSE SERPL-MCNC: 104 MG/DL (ref 74–100)
HCT VFR BLD AUTO: 41.6 % (ref 42–52)
HDLC SERPL-MCNC: 39 MG/DL (ref 35–60)
HGB BLD-MCNC: 13.7 G/DL (ref 14–18)
IMM GRANULOCYTES # BLD AUTO: 0.01 X10(3)/MCL (ref 0–0.04)
IMM GRANULOCYTES NFR BLD AUTO: 0.1 %
LDLC SERPL CALC-MCNC: 44 MG/DL (ref 50–140)
LYMPHOCYTES # BLD AUTO: 2.13 X10(3)/MCL (ref 0.6–4.6)
LYMPHOCYTES NFR BLD AUTO: 27.8 %
MCH RBC QN AUTO: 28.2 PG (ref 27–31)
MCHC RBC AUTO-ENTMCNC: 32.9 G/DL (ref 33–36)
MCV RBC AUTO: 85.8 FL (ref 80–94)
MICROALBUMIN UR-MCNC: 25 UG/ML
MONOCYTES # BLD AUTO: 0.7 X10(3)/MCL (ref 0.1–1.3)
MONOCYTES NFR BLD AUTO: 9.1 %
NEUTROPHILS # BLD AUTO: 4.64 X10(3)/MCL (ref 2.1–9.2)
NEUTROPHILS NFR BLD AUTO: 60.5 %
NRBC BLD AUTO-RTO: 0.3 %
PLATELET # BLD AUTO: 231 X10(3)/MCL (ref 130–400)
PMV BLD AUTO: 10.3 FL (ref 7.4–10.4)
POTASSIUM SERPL-SCNC: 4.2 MMOL/L (ref 3.5–5.1)
PROT SERPL-MCNC: 7.6 GM/DL (ref 6.4–8.3)
RBC # BLD AUTO: 4.85 X10(6)/MCL (ref 4.7–6.1)
SODIUM SERPL-SCNC: 142 MMOL/L (ref 136–145)
TRIGL SERPL-MCNC: 100 MG/DL (ref 34–140)
VLDLC SERPL CALC-MCNC: 20 MG/DL
WBC # BLD AUTO: 7.67 X10(3)/MCL (ref 4.5–11.5)

## 2025-08-25 PROCEDURE — 82043 UR ALBUMIN QUANTITATIVE: CPT

## 2025-08-25 PROCEDURE — 80053 COMPREHEN METABOLIC PANEL: CPT

## 2025-08-25 PROCEDURE — 36415 COLL VENOUS BLD VENIPUNCTURE: CPT

## 2025-08-25 PROCEDURE — 85025 COMPLETE CBC W/AUTO DIFF WBC: CPT

## 2025-08-25 PROCEDURE — 80061 LIPID PANEL: CPT

## 2025-08-27 ENCOUNTER — RESULTS FOLLOW-UP (OUTPATIENT)
Dept: INTERNAL MEDICINE | Facility: CLINIC | Age: 55
End: 2025-08-27

## 2025-08-27 ENCOUNTER — CLINICAL SUPPORT (OUTPATIENT)
Dept: INTERNAL MEDICINE | Facility: CLINIC | Age: 55
End: 2025-08-27
Attending: NURSE PRACTITIONER

## 2025-08-27 ENCOUNTER — OFFICE VISIT (OUTPATIENT)
Dept: INTERNAL MEDICINE | Facility: CLINIC | Age: 55
End: 2025-08-27

## 2025-08-27 VITALS
SYSTOLIC BLOOD PRESSURE: 136 MMHG | TEMPERATURE: 98 F | WEIGHT: 193.5 LBS | OXYGEN SATURATION: 99 % | HEART RATE: 77 BPM | HEIGHT: 66 IN | RESPIRATION RATE: 20 BRPM | DIASTOLIC BLOOD PRESSURE: 72 MMHG | BODY MASS INDEX: 31.1 KG/M2

## 2025-08-27 DIAGNOSIS — I10 PRIMARY HYPERTENSION: ICD-10-CM

## 2025-08-27 DIAGNOSIS — Z12.5 PROSTATE CANCER SCREENING: ICD-10-CM

## 2025-08-27 DIAGNOSIS — R01.1 HEART MURMUR: ICD-10-CM

## 2025-08-27 DIAGNOSIS — D64.9 ANEMIA, UNSPECIFIED TYPE: ICD-10-CM

## 2025-08-27 DIAGNOSIS — Z79.4 TYPE 2 DIABETES MELLITUS WITHOUT COMPLICATION, WITH LONG-TERM CURRENT USE OF INSULIN: Primary | ICD-10-CM

## 2025-08-27 DIAGNOSIS — E11.9 TYPE 2 DIABETES MELLITUS WITHOUT COMPLICATION, WITH LONG-TERM CURRENT USE OF INSULIN: Primary | ICD-10-CM

## 2025-08-27 DIAGNOSIS — E11.3293 MILD NONPROLIFERATIVE DIABETIC RETINOPATHY OF BOTH EYES WITHOUT MACULAR EDEMA ASSOCIATED WITH TYPE 2 DIABETES MELLITUS: Primary | ICD-10-CM

## 2025-08-27 DIAGNOSIS — Z79.4 TYPE 2 DIABETES MELLITUS WITHOUT COMPLICATION, WITH LONG-TERM CURRENT USE OF INSULIN: ICD-10-CM

## 2025-08-27 DIAGNOSIS — E11.9 TYPE 2 DIABETES MELLITUS WITHOUT COMPLICATION, WITH LONG-TERM CURRENT USE OF INSULIN: ICD-10-CM

## 2025-08-27 PROCEDURE — 92228 IMG RTA DETC/MNTR DS PHY/QHP: CPT | Mod: TC,PBBFAC | Performed by: FAMILY MEDICINE

## 2025-08-27 PROCEDURE — 99214 OFFICE O/P EST MOD 30 MIN: CPT | Mod: S$PBB,,, | Performed by: NURSE PRACTITIONER

## 2025-08-27 PROCEDURE — 92228 IMG RTA DETC/MNTR DS PHY/QHP: CPT | Mod: 26,TC,S$PBB, | Performed by: FAMILY MEDICINE

## 2025-08-27 PROCEDURE — 99215 OFFICE O/P EST HI 40 MIN: CPT | Mod: PBBFAC | Performed by: NURSE PRACTITIONER

## 2025-08-27 PROCEDURE — 92228 IMG RTA DETC/MNTR DS PHY/QHP: CPT | Mod: PBBFAC

## 2025-08-27 RX ORDER — AMLODIPINE BESYLATE 10 MG/1
10 TABLET ORAL DAILY
Qty: 90 TABLET | Refills: 2 | Status: SHIPPED | OUTPATIENT
Start: 2025-08-27

## 2025-08-27 RX ORDER — INSULIN GLARGINE 100 [IU]/ML
25 INJECTION, SOLUTION SUBCUTANEOUS 2 TIMES DAILY
Qty: 15 ML | Refills: 6 | Status: SHIPPED | OUTPATIENT
Start: 2025-08-27

## 2025-08-27 RX ORDER — LISINOPRIL 2.5 MG/1
2.5 TABLET ORAL DAILY
Qty: 90 TABLET | Refills: 2 | Status: SHIPPED | OUTPATIENT
Start: 2025-08-27

## 2025-08-28 ENCOUNTER — TELEPHONE (OUTPATIENT)
Dept: INTERNAL MEDICINE | Facility: CLINIC | Age: 55
End: 2025-08-28

## 2025-09-03 ENCOUNTER — TELEPHONE (OUTPATIENT)
Facility: CLINIC | Age: 55
End: 2025-09-03